# Patient Record
Sex: MALE | Race: WHITE | Employment: FULL TIME | URBAN - METROPOLITAN AREA
[De-identification: names, ages, dates, MRNs, and addresses within clinical notes are randomized per-mention and may not be internally consistent; named-entity substitution may affect disease eponyms.]

---

## 2019-05-25 ENCOUNTER — HOSPITAL ENCOUNTER (OUTPATIENT)
Age: 34
Discharge: HOME | End: 2019-05-25
Attending: FAMILY MEDICINE
Payer: COMMERCIAL

## 2019-05-25 VITALS
OXYGEN SATURATION: 96 % | WEIGHT: 250 LBS | SYSTOLIC BLOOD PRESSURE: 127 MMHG | HEIGHT: 70 IN | BODY MASS INDEX: 35.79 KG/M2 | HEART RATE: 72 BPM | DIASTOLIC BLOOD PRESSURE: 83 MMHG | TEMPERATURE: 98 F | RESPIRATION RATE: 18 BRPM
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
## 2019-05-25 PROCEDURE — 99203 OFFICE O/P NEW LOW 30 MIN: CPT

## 2019-05-25 RX ORDER — DOXYCYCLINE HYCLATE 100 MG/1
200 CAPSULE ORAL ONCE
Qty: 2 CAPSULE | Refills: 0 | Status: SHIPPED | OUTPATIENT
Start: 2019-05-25 | End: 2019-05-25

## 2019-05-25 NOTE — ED NOTES
Pt states he was in [place] the last 24 hours and thinks he has a tick in his left thigh. Not sure how long it has been there. Noticed it 2 hours ago.

## 2019-05-25 NOTE — ED PROVIDER NOTES
Patient Seen in: [facility] In [address] [place] [address]    History   Patient presents with:  Bite Sting,Insect (integumentary)    Stated Complaint: tick    HPI      Patient with a tick bite to the right medial lower thigh area.    This was in [facility] ([facility] has a normal mood and affect. His behavior is normal.   Nursing note and vitals reviewed. ED Course     After verbal consent, rest of the body of the possible take that was embedded into the skin was easily removed.     MDM     Disposition and Plan

## 2021-09-08 ENCOUNTER — TELEMEDICINE (OUTPATIENT)
Dept: FAMILY MEDICINE CLINIC | Facility: CLINIC | Age: 36
End: 2021-09-08

## 2021-09-08 DIAGNOSIS — Z86.79 HISTORY OF ESSENTIAL HYPERTENSION: ICD-10-CM

## 2021-09-08 DIAGNOSIS — Z86.39 HISTORY OF ELEVATED GLUCOSE: ICD-10-CM

## 2021-09-08 DIAGNOSIS — J01.90 ACUTE NON-RECURRENT SINUSITIS, UNSPECIFIED LOCATION: Primary | ICD-10-CM

## 2021-09-08 PROCEDURE — 99204 OFFICE O/P NEW MOD 45 MIN: CPT | Performed by: FAMILY MEDICINE

## 2021-09-08 RX ORDER — AMOXICILLIN 875 MG/1
875 TABLET, COATED ORAL 2 TIMES DAILY
Qty: 20 TABLET | Refills: 0 | Status: SHIPPED | OUTPATIENT
Start: 2021-09-08 | End: 2021-09-18

## 2021-09-08 RX ORDER — FLUTICASONE PROPIONATE 50 MCG
1 SPRAY, SUSPENSION (ML) NASAL DAILY
Qty: 16 G | Refills: 0 | Status: SHIPPED | OUTPATIENT
Start: 2021-09-08 | End: 2021-09-18

## 2021-09-08 NOTE — PROGRESS NOTES
This visit is conducted using Telemedicine with live, interactive video and audio. Patient has been referred to the [facility] website at [email or web address] to review the yearly Consent to Treat document.     Patient understands and accepts financial res for cough. Cardiovascular: Negative. EXAM:   There were no vitals taken for this visit. Physical Exam  Constitutional:       General: He is not in acute distress.   Pulmonary:      Effort: Pulmonary effort is normal.   Neurological:      Men

## 2021-09-20 ENCOUNTER — TELEMEDICINE (OUTPATIENT)
Dept: FAMILY MEDICINE CLINIC | Facility: CLINIC | Age: 36
End: 2021-09-20
Payer: COMMERCIAL

## 2021-09-20 ENCOUNTER — HOSPITAL ENCOUNTER (OUTPATIENT)
Dept: GENERAL RADIOLOGY | Age: 36
Discharge: HOME | End: 2021-09-20
Attending: FAMILY MEDICINE
Payer: COMMERCIAL

## 2021-09-20 DIAGNOSIS — R05.9 COUGH: Primary | ICD-10-CM

## 2021-09-20 DIAGNOSIS — R05.9 COUGH: ICD-10-CM

## 2021-09-20 PROCEDURE — 71046 X-RAY EXAM CHEST 2 VIEWS: CPT | Performed by: FAMILY MEDICINE

## 2021-09-20 PROCEDURE — 99213 OFFICE O/P EST LOW 20 MIN: CPT | Performed by: FAMILY MEDICINE

## 2021-09-20 NOTE — PROGRESS NOTES
Gloria Long is a 43year old male.   HPI:   Patient is following up the last appointment, he reports that overall he is doing much better, sinus congestion as well as postnasal drip has resolved completely but he continues to have cough, cough is more evide MG/5ML Oral Liquid; Take 5 mL by mouth in the morning, at noon, in the evening, and at bedtime for 14 days. Dispense: 118 mL; Refill: 0       The patient indicates understanding of these issues and agrees to the plan.       The above note was creating usin

## 2021-09-21 ENCOUNTER — TELEPHONE (OUTPATIENT)
Dept: FAMILY MEDICINE CLINIC | Facility: CLINIC | Age: 36
End: 2021-09-21

## 2021-09-21 NOTE — TELEPHONE ENCOUNTER
Patient seen in virtual today and was prescribed Dextromethorphan-guaiFENesin . He went to the pharmacy and they didn't fill it because it's sold over the counter.   I called pharmacy and they advised it's the same as Mucinex DM    Patient is asking if you

## 2021-09-22 RX ORDER — GUAIFENESIN AND CODEINE PHOSPHATE 100; 10 MG/5ML; MG/5ML
SOLUTION ORAL
Qty: 118 ML | Refills: 0 | Status: SHIPPED | OUTPATIENT
Start: 2021-09-22 | End: 2021-10-04

## 2021-09-22 NOTE — TELEPHONE ENCOUNTER
Patient calling to follow up on a stronger medication than just the over the counter. Please advise.

## 2021-09-22 NOTE — TELEPHONE ENCOUNTER
New prescription sent, please reinforce medication can cause drowsiness, in such case he should not be driving.

## 2021-10-01 ENCOUNTER — TELEPHONE (OUTPATIENT)
Dept: FAMILY MEDICINE CLINIC | Facility: CLINIC | Age: 36
End: 2021-10-01

## 2021-10-01 NOTE — TELEPHONE ENCOUNTER
Dr. Ran Cm- is patient okay to see you in person 10/4? Scheduled video visit but patient adamant on coming in, states you discussed him coming in if cough persists.     Please reply to ema: EM IDALMIS Keenan      Future Appointments   Date Time Provider Departme

## 2021-10-01 NOTE — TELEPHONE ENCOUNTER
Spoke with patient ( verified) and relayed dr. Latonia Dumas message below--patient verbalizes understanding and agreement. Video visit converted to in office visit. No further questions/concerns at this time.             MyChart Status: Active  Results Linda

## 2021-10-04 ENCOUNTER — OFFICE VISIT (OUTPATIENT)
Dept: FAMILY MEDICINE CLINIC | Facility: CLINIC | Age: 36
End: 2021-10-04

## 2021-10-04 ENCOUNTER — HOSPITAL ENCOUNTER (OUTPATIENT)
Dept: GENERAL RADIOLOGY | Age: 36
Discharge: HOME | End: 2021-10-04
Attending: FAMILY MEDICINE

## 2021-10-04 VITALS
HEIGHT: 70 IN | WEIGHT: 265 LBS | BODY MASS INDEX: 37.94 KG/M2 | SYSTOLIC BLOOD PRESSURE: 137 MMHG | HEART RATE: 81 BPM | DIASTOLIC BLOOD PRESSURE: 81 MMHG

## 2021-10-04 DIAGNOSIS — J34.3 HYPERTROPHY, NASAL, TURBINATE: ICD-10-CM

## 2021-10-04 DIAGNOSIS — R05.3 CHRONIC COUGH: Primary | ICD-10-CM

## 2021-10-04 DIAGNOSIS — R09.82 POSTNASAL DRIP: ICD-10-CM

## 2021-10-04 PROCEDURE — 3008F BODY MASS INDEX DOCD: CPT | Performed by: FAMILY MEDICINE

## 2021-10-04 PROCEDURE — 3079F DIAST BP 80-89 MM HG: CPT | Performed by: FAMILY MEDICINE

## 2021-10-04 PROCEDURE — 70220 X-RAY EXAM OF SINUSES: CPT | Performed by: FAMILY MEDICINE

## 2021-10-04 PROCEDURE — 3075F SYST BP GE 130 - 139MM HG: CPT | Performed by: FAMILY MEDICINE

## 2021-10-04 PROCEDURE — 99213 OFFICE O/P EST LOW 20 MIN: CPT | Performed by: FAMILY MEDICINE

## 2021-10-04 RX ORDER — CETIRIZINE HYDROCHLORIDE 10 MG/1
10 TABLET ORAL DAILY
Qty: 30 TABLET | Refills: 0 | Status: SHIPPED | OUTPATIENT
Start: 2021-10-04 | End: 2021-10-27

## 2021-10-04 NOTE — PROGRESS NOTES
Flynn Diehl is a 43year old male.   HPI:   Patient is here to follow-up in last visit, he report that he continues to have persistent cough, cough is especially present when he is talking, he explained that over the weekend symptoms were so severe that he Pulmonary effort is normal.      Breath sounds: Normal breath sounds. Musculoskeletal:      Cervical back: Normal range of motion. Neurological:      General: No focal deficit present.       Mental Status: He is alert and oriented to person, place, and

## 2021-10-07 ENCOUNTER — OFFICE VISIT (OUTPATIENT)
Dept: OTOLARYNGOLOGY | Facility: CLINIC | Age: 36
End: 2021-10-07
Payer: COMMERCIAL

## 2021-10-07 VITALS — WEIGHT: 265 LBS | BODY MASS INDEX: 37.94 KG/M2 | HEIGHT: 70 IN

## 2021-10-07 DIAGNOSIS — R05.9 COUGH IN ADULT PATIENT: Primary | ICD-10-CM

## 2021-10-07 DIAGNOSIS — J34.2 NASAL SEPTAL DEVIATION: ICD-10-CM

## 2021-10-07 PROCEDURE — 99203 OFFICE O/P NEW LOW 30 MIN: CPT | Performed by: SPECIALIST

## 2021-10-07 PROCEDURE — 92511 NASOPHARYNGOSCOPY: CPT | Performed by: SPECIALIST

## 2021-10-07 PROCEDURE — 3008F BODY MASS INDEX DOCD: CPT | Performed by: SPECIALIST

## 2021-10-07 RX ORDER — AZITHROMYCIN 250 MG/1
TABLET, FILM COATED ORAL
Qty: 6 TABLET | Refills: 1 | Status: SHIPPED | OUTPATIENT
Start: 2021-10-07

## 2021-10-07 NOTE — PATIENT INSTRUCTIONS
You had a left septal deviation. There was no sinusitis. I suspect you might have whooping cough. I placed you on a Z-Ramakrishna and would like you to repeated x1. Call if you are not significantly better by Monday.

## 2021-10-07 NOTE — PROGRESS NOTES
Eugene Kline is a 43year old male. Patient presents with:  Cough: pt has been having a cough for about 9 weeks now. pt has done multiple rounds of anitboitics and nasal sprays     HPI:   Very significant hacking cough.   His wife has had a similar cough whi Submental. Submandibular. Anterior cervical. Posterior cervical. Supraclavicular. All without enlargement   Psychiatric Orientation - Oriented to time, place, person & situation. Appropriate mood and affect.    Neurological Memory - Normal. Cranial nerves

## 2021-10-14 ENCOUNTER — LAB ENCOUNTER (OUTPATIENT)
Dept: LAB | Age: 36
End: 2021-10-14
Attending: SPECIALIST
Payer: COMMERCIAL

## 2021-10-14 ENCOUNTER — OFFICE VISIT (OUTPATIENT)
Dept: OTOLARYNGOLOGY | Facility: CLINIC | Age: 36
End: 2021-10-14
Payer: COMMERCIAL

## 2021-10-14 VITALS — BODY MASS INDEX: 37.94 KG/M2 | WEIGHT: 265 LBS | HEIGHT: 70 IN

## 2021-10-14 DIAGNOSIS — R05.9 COUGH: Primary | ICD-10-CM

## 2021-10-14 DIAGNOSIS — R05.9 COUGH: ICD-10-CM

## 2021-10-14 PROCEDURE — 86615 BORDETELLA ANTIBODY: CPT

## 2021-10-14 PROCEDURE — 99213 OFFICE O/P EST LOW 20 MIN: CPT | Performed by: SPECIALIST

## 2021-10-14 PROCEDURE — 36415 COLL VENOUS BLD VENIPUNCTURE: CPT

## 2021-10-14 PROCEDURE — 3008F BODY MASS INDEX DOCD: CPT | Performed by: SPECIALIST

## 2021-10-14 RX ORDER — PREDNISONE 20 MG/1
TABLET ORAL
Qty: 3 TABLET | Refills: 0 | Status: SHIPPED | OUTPATIENT
Start: 2021-10-14

## 2021-10-14 RX ORDER — AZITHROMYCIN 250 MG/1
TABLET, FILM COATED ORAL
Qty: 6 TABLET | Refills: 0 | Status: SHIPPED | OUTPATIENT
Start: 2021-10-14

## 2021-10-14 NOTE — PATIENT INSTRUCTIONS
We will get blood work to look for antibodies for whooping cough. In the meantime, continue Zithromax and I added a short course of steroid. I will call you with all results.

## 2021-10-14 NOTE — PROGRESS NOTES
Gloria Long is a 43year old male. Patient presents with:  Cough: Pt is here follow up  feels as if cough hasnt changed its been about 10 weeks now and cough is still there. HPI:   Coughing, some days are better and some still with a severe cough.     C Normal. Palpation - Normal. Parotid gland - Normal. Thyroid gland - Normal.   Lymph Detail Submental. Submandibular.  Anterior cervical. Posterior cervical. Supraclavicular all without enlargement   Psychiatric Orientation - Oriented to time, place, person

## 2021-10-18 RX ORDER — AZITHROMYCIN 250 MG/1
TABLET, FILM COATED ORAL
Qty: 6 TABLET | Refills: 0 | Status: SHIPPED | OUTPATIENT
Start: 2021-10-18

## 2021-10-27 ENCOUNTER — TELEPHONE (OUTPATIENT)
Dept: OTOLARYNGOLOGY | Facility: CLINIC | Age: 36
End: 2021-10-27

## 2021-10-27 DIAGNOSIS — R09.82 POSTNASAL DRIP: ICD-10-CM

## 2021-10-27 RX ORDER — CETIRIZINE HYDROCHLORIDE 10 MG/1
10 TABLET ORAL DAILY
Qty: 90 TABLET | Refills: 1 | Status: SHIPPED | OUTPATIENT
Start: 2021-10-27

## 2021-10-27 RX ORDER — AZITHROMYCIN 250 MG/1
TABLET, FILM COATED ORAL
Qty: 21 TABLET | Refills: 0 | OUTPATIENT
Start: 2021-10-27

## 2021-10-27 NOTE — TELEPHONE ENCOUNTER
Dr. Cynthia Li - Although Refill passed per [facility] protocol, you had sent a 30-day supply. Last office visit 10/4/21  Just wanted to check if this was a trial and if okay to fill 90 day supply with 1 refill? Please advise, thank you!   Medication pen

## 2021-10-28 RX ORDER — AZITHROMYCIN 250 MG/1
TABLET, FILM COATED ORAL
Qty: 6 TABLET | Refills: 0 | OUTPATIENT
Start: 2021-10-28

## 2023-02-22 ENCOUNTER — HOSPITAL ENCOUNTER (OUTPATIENT)
Age: 38
Discharge: HOME | End: 2023-02-22
Payer: COMMERCIAL

## 2023-02-22 VITALS
BODY MASS INDEX: 37.94 KG/M2 | DIASTOLIC BLOOD PRESSURE: 103 MMHG | SYSTOLIC BLOOD PRESSURE: 147 MMHG | WEIGHT: 265 LBS | RESPIRATION RATE: 18 BRPM | HEART RATE: 101 BPM | OXYGEN SATURATION: 97 % | HEIGHT: 70 IN | TEMPERATURE: 99 F

## 2023-02-22 DIAGNOSIS — R53.81 MALAISE AND FATIGUE: ICD-10-CM

## 2023-02-22 DIAGNOSIS — R53.83 MALAISE AND FATIGUE: ICD-10-CM

## 2023-02-22 DIAGNOSIS — R03.0 ELEVATED BP WITHOUT DIAGNOSIS OF HYPERTENSION: ICD-10-CM

## 2023-02-22 DIAGNOSIS — J02.0 STREP PHARYNGITIS: Primary | ICD-10-CM

## 2023-02-22 LAB
POCT INFLUENZA A: NEGATIVE
POCT INFLUENZA B: NEGATIVE
S PYO AG THROAT QL: POSITIVE

## 2023-02-22 PROCEDURE — 87880 STREP A ASSAY W/OPTIC: CPT | Performed by: NURSE PRACTITIONER

## 2023-02-22 PROCEDURE — 99203 OFFICE O/P NEW LOW 30 MIN: CPT | Performed by: NURSE PRACTITIONER

## 2023-02-22 PROCEDURE — 87502 INFLUENZA DNA AMP PROBE: CPT | Performed by: NURSE PRACTITIONER

## 2023-02-22 RX ORDER — AMOXICILLIN 500 MG/1
500 TABLET, FILM COATED ORAL 2 TIMES DAILY
Qty: 20 TABLET | Refills: 0 | Status: SHIPPED | OUTPATIENT
Start: 2023-02-22 | End: 2023-03-04

## 2023-02-22 NOTE — DISCHARGE INSTRUCTIONS
Amoxicillin 1 tablet twice per day for 10 days  Please take acetaminophen (Tylenol) 650-1000 mg every 6 hours for fever/pain  OR  Ibuprofen (Motrin, Advil) 600 mg every 6 hours for fever/pain, with food  Warm fluids  Throat drops/lozenges e.g. Halls, Cepacol  Warm salt water gargles (1/2 teaspoon of salt to 8 oz of warm water)  No sharing cups, utensils, straws, etc.  Throw away toothbrush in 24 hours  You are considered contagious until on antibiotics for at least 24 hours   For inability to swallow, voice changes, difficulty breathing, drooling or worsening symptoms, please go to the emergency room  See your primary care provider if no better after antibiotics      Your blood pressure is high today. Please check your blood pressure at home 3 times per day, and write it down. Do this for 5 days. Follow up with your doctor in 5 days, and bring the blood pressure log with you for review. For now, avoid high sodium foods. Avoid decongestants like Afrin nasal spray or Sudafed. Blood pressure over 180/100 is concerning, if you have 2 consecutive readings like this, please call your primary care provider or go directly to the emergency room. If you develop chest pain, shortness of breath, dizziness, headache or other unusual symptoms, please go to the emergency room.

## 2023-02-22 NOTE — ED INITIAL ASSESSMENT (HPI)
Sore throat and fever with body aches and cough since Saturday. +bilateral ear pain. 2 negative covid test, 1 on Saturday and 1 today.

## 2023-05-09 ENCOUNTER — HOSPITAL ENCOUNTER (OUTPATIENT)
Age: 38
Discharge: HOME | End: 2023-05-09
Payer: COMMERCIAL

## 2023-05-09 VITALS
OXYGEN SATURATION: 97 % | SYSTOLIC BLOOD PRESSURE: 146 MMHG | DIASTOLIC BLOOD PRESSURE: 89 MMHG | TEMPERATURE: 97 F | RESPIRATION RATE: 16 BRPM | HEART RATE: 78 BPM

## 2023-05-09 DIAGNOSIS — H66.91 RIGHT OTITIS MEDIA, UNSPECIFIED OTITIS MEDIA TYPE: Primary | ICD-10-CM

## 2023-05-09 PROCEDURE — 99213 OFFICE O/P EST LOW 20 MIN: CPT | Performed by: NURSE PRACTITIONER

## 2023-05-09 RX ORDER — AMOXICILLIN AND CLAVULANATE POTASSIUM 875; 125 MG/1; MG/1
1 TABLET, FILM COATED ORAL 2 TIMES DAILY
Qty: 20 TABLET | Refills: 0 | Status: SHIPPED | OUTPATIENT
Start: 2023-05-09 | End: 2023-05-19

## 2023-05-09 NOTE — DISCHARGE INSTRUCTIONS
Augmentin 1 tablet twice per day for 10 days with food  Continue tylelnol/ibuprofen for pain  Return for new or worsening symptoms

## 2023-10-04 ENCOUNTER — OFFICE VISIT (OUTPATIENT)
Dept: FAMILY MEDICINE CLINIC | Facility: CLINIC | Age: 38
End: 2023-10-04

## 2023-10-04 ENCOUNTER — EKG ENCOUNTER (OUTPATIENT)
Dept: LAB | Age: 38
End: 2023-10-04
Attending: FAMILY MEDICINE
Payer: COMMERCIAL

## 2023-10-04 ENCOUNTER — HOSPITAL ENCOUNTER (OUTPATIENT)
Dept: GENERAL RADIOLOGY | Age: 38
Discharge: HOME | End: 2023-10-04
Attending: FAMILY MEDICINE
Payer: COMMERCIAL

## 2023-10-04 ENCOUNTER — LAB ENCOUNTER (OUTPATIENT)
Dept: LAB | Age: 38
End: 2023-10-04
Attending: FAMILY MEDICINE
Payer: COMMERCIAL

## 2023-10-04 VITALS
WEIGHT: 270 LBS | SYSTOLIC BLOOD PRESSURE: 145 MMHG | BODY MASS INDEX: 38.65 KG/M2 | HEART RATE: 77 BPM | RESPIRATION RATE: 17 BRPM | DIASTOLIC BLOOD PRESSURE: 80 MMHG | TEMPERATURE: 98 F | HEIGHT: 70 IN

## 2023-10-04 DIAGNOSIS — I10 HYPERTENSION, ESSENTIAL: ICD-10-CM

## 2023-10-04 DIAGNOSIS — R05.1 ACUTE COUGH: ICD-10-CM

## 2023-10-04 DIAGNOSIS — R05.1 ACUTE COUGH: Primary | ICD-10-CM

## 2023-10-04 LAB
ALBUMIN SERPL-MCNC: 4.2 G/DL (ref 3.4–5)
ALBUMIN/GLOB SERPL: 1 {RATIO} (ref 1–2)
ALP LIVER SERPL-CCNC: 66 U/L
ALT SERPL-CCNC: 62 U/L
ANION GAP SERPL CALC-SCNC: 8 MMOL/L (ref 0–18)
AST SERPL-CCNC: 27 U/L (ref 15–37)
BASOPHILS # BLD AUTO: 0.09 X10(3) UL (ref 0–0.2)
BASOPHILS NFR BLD AUTO: 1 %
BILIRUB SERPL-MCNC: 0.6 MG/DL (ref 0.1–2)
BUN BLD-MCNC: 18 MG/DL (ref 7–18)
BUN/CREAT SERPL: 14.4 (ref 10–20)
CALCIUM BLD-MCNC: 9.5 MG/DL (ref 8.5–10.1)
CHLORIDE SERPL-SCNC: 106 MMOL/L (ref 98–112)
CHOLEST SERPL-MCNC: 252 MG/DL (ref ?–200)
CO2 SERPL-SCNC: 28 MMOL/L (ref 21–32)
CREAT BLD-MCNC: 1.25 MG/DL
DEPRECATED RDW RBC AUTO: 40 FL (ref 35.1–46.3)
EGFRCR SERPLBLD CKD-EPI 2021: 75 ML/MIN/1.73M2 (ref 60–?)
EOSINOPHIL # BLD AUTO: 0.16 X10(3) UL (ref 0–0.7)
EOSINOPHIL NFR BLD AUTO: 1.8 %
ERYTHROCYTE [DISTWIDTH] IN BLOOD BY AUTOMATED COUNT: 12.7 % (ref 11–15)
FASTING PATIENT LIPID ANSWER: YES
FASTING STATUS PATIENT QL REPORTED: YES
GLOBULIN PLAS-MCNC: 4.1 G/DL (ref 2.8–4.4)
GLUCOSE BLD-MCNC: 98 MG/DL (ref 70–99)
HCT VFR BLD AUTO: 51.8 %
HDLC SERPL-MCNC: 54 MG/DL (ref 40–59)
HGB BLD-MCNC: 17.5 G/DL
IMM GRANULOCYTES # BLD AUTO: 0.04 X10(3) UL (ref 0–1)
IMM GRANULOCYTES NFR BLD: 0.4 %
LDLC SERPL CALC-MCNC: 165 MG/DL (ref ?–100)
LYMPHOCYTES # BLD AUTO: 2.53 X10(3) UL (ref 1–4)
LYMPHOCYTES NFR BLD AUTO: 28.2 %
MCH RBC QN AUTO: 29.5 PG (ref 26–34)
MCHC RBC AUTO-ENTMCNC: 33.8 G/DL (ref 31–37)
MCV RBC AUTO: 87.2 FL
MONOCYTES # BLD AUTO: 0.67 X10(3) UL (ref 0.1–1)
MONOCYTES NFR BLD AUTO: 7.5 %
NEUTROPHILS # BLD AUTO: 5.47 X10 (3) UL (ref 1.5–7.7)
NEUTROPHILS # BLD AUTO: 5.47 X10(3) UL (ref 1.5–7.7)
NEUTROPHILS NFR BLD AUTO: 61.1 %
NONHDLC SERPL-MCNC: 198 MG/DL (ref ?–130)
OSMOLALITY SERPL CALC.SUM OF ELEC: 296 MOSM/KG (ref 275–295)
PLATELET # BLD AUTO: 283 10(3)UL (ref 150–450)
POTASSIUM SERPL-SCNC: 4.3 MMOL/L (ref 3.5–5.1)
PROT SERPL-MCNC: 8.3 G/DL (ref 6.4–8.2)
RBC # BLD AUTO: 5.94 X10(6)UL
SODIUM SERPL-SCNC: 142 MMOL/L (ref 136–145)
TRIGL SERPL-MCNC: 180 MG/DL (ref 30–149)
VLDLC SERPL CALC-MCNC: 36 MG/DL (ref 0–30)
WBC # BLD AUTO: 9 X10(3) UL (ref 4–11)

## 2023-10-04 PROCEDURE — 93010 ELECTROCARDIOGRAM REPORT: CPT | Performed by: INTERNAL MEDICINE

## 2023-10-04 PROCEDURE — 85025 COMPLETE CBC W/AUTO DIFF WBC: CPT

## 2023-10-04 PROCEDURE — 71046 X-RAY EXAM CHEST 2 VIEWS: CPT | Performed by: FAMILY MEDICINE

## 2023-10-04 PROCEDURE — 80053 COMPREHEN METABOLIC PANEL: CPT

## 2023-10-04 PROCEDURE — 80061 LIPID PANEL: CPT

## 2023-10-04 PROCEDURE — 93005 ELECTROCARDIOGRAM TRACING: CPT

## 2023-10-04 PROCEDURE — 36415 COLL VENOUS BLD VENIPUNCTURE: CPT

## 2023-10-04 RX ORDER — BENZONATATE 200 MG/1
200 CAPSULE ORAL 3 TIMES DAILY PRN
Qty: 30 CAPSULE | Refills: 0 | Status: SHIPPED | OUTPATIENT
Start: 2023-10-04

## 2023-10-04 RX ORDER — LOSARTAN POTASSIUM 50 MG/1
50 TABLET ORAL DAILY
Qty: 90 TABLET | Refills: 0 | Status: SHIPPED | OUTPATIENT
Start: 2023-10-04

## 2023-10-05 LAB
ATRIAL RATE: 67 BPM
P AXIS: 40 DEGREES
P-R INTERVAL: 152 MS
Q-T INTERVAL: 386 MS
QRS DURATION: 98 MS
QTC CALCULATION (BEZET): 407 MS
R AXIS: 51 DEGREES
T AXIS: 22 DEGREES
VENTRICULAR RATE: 67 BPM

## 2023-10-18 ENCOUNTER — OFFICE VISIT (OUTPATIENT)
Dept: FAMILY MEDICINE CLINIC | Facility: CLINIC | Age: 38
End: 2023-10-18
Payer: COMMERCIAL

## 2023-10-18 VITALS
SYSTOLIC BLOOD PRESSURE: 118 MMHG | RESPIRATION RATE: 18 BRPM | HEIGHT: 70 IN | HEART RATE: 83 BPM | BODY MASS INDEX: 38.22 KG/M2 | DIASTOLIC BLOOD PRESSURE: 78 MMHG | WEIGHT: 267 LBS

## 2023-10-18 DIAGNOSIS — Z13.0 SCREENING FOR DEFICIENCY ANEMIA: ICD-10-CM

## 2023-10-18 DIAGNOSIS — Z13.220 LIPID SCREENING: ICD-10-CM

## 2023-10-18 DIAGNOSIS — R73.09 ELEVATED GLUCOSE: ICD-10-CM

## 2023-10-18 DIAGNOSIS — Z23 NEED FOR TDAP VACCINATION: ICD-10-CM

## 2023-10-18 DIAGNOSIS — I10 HYPERTENSION, ESSENTIAL: ICD-10-CM

## 2023-10-18 DIAGNOSIS — Z00.00 WELLNESS EXAMINATION: Primary | ICD-10-CM

## 2023-10-18 DIAGNOSIS — Z13.29 THYROID DISORDER SCREEN: ICD-10-CM

## 2023-10-18 DIAGNOSIS — R05.1 ACUTE COUGH: ICD-10-CM

## 2023-10-18 PROCEDURE — 90471 IMMUNIZATION ADMIN: CPT | Performed by: FAMILY MEDICINE

## 2023-10-18 PROCEDURE — 99213 OFFICE O/P EST LOW 20 MIN: CPT | Performed by: FAMILY MEDICINE

## 2023-10-18 PROCEDURE — 3078F DIAST BP <80 MM HG: CPT | Performed by: FAMILY MEDICINE

## 2023-10-18 PROCEDURE — 3074F SYST BP LT 130 MM HG: CPT | Performed by: FAMILY MEDICINE

## 2023-10-18 PROCEDURE — 3008F BODY MASS INDEX DOCD: CPT | Performed by: FAMILY MEDICINE

## 2023-10-18 PROCEDURE — 99396 PREV VISIT EST AGE 40-64: CPT | Performed by: FAMILY MEDICINE

## 2023-10-18 PROCEDURE — 90715 TDAP VACCINE 7 YRS/> IM: CPT | Performed by: FAMILY MEDICINE

## 2023-10-18 RX ORDER — ALBUTEROL SULFATE 90 UG/1
2 AEROSOL, METERED RESPIRATORY (INHALATION) EVERY 4 HOURS PRN
Qty: 18 G | Refills: 5 | Status: SHIPPED | OUTPATIENT
Start: 2023-10-18

## 2023-10-18 RX ORDER — LOSARTAN POTASSIUM 50 MG/1
50 TABLET ORAL DAILY
Qty: 90 TABLET | Refills: 3 | Status: SHIPPED | OUTPATIENT
Start: 2023-10-18

## 2023-10-18 RX ORDER — AZITHROMYCIN 250 MG/1
TABLET, FILM COATED ORAL
Qty: 6 TABLET | Refills: 0 | Status: SHIPPED | OUTPATIENT
Start: 2023-10-18 | End: 2023-10-22

## 2023-10-18 RX ORDER — PREDNISONE 20 MG/1
TABLET ORAL
Qty: 10 TABLET | Refills: 0 | Status: SHIPPED | OUTPATIENT
Start: 2023-10-18

## 2023-11-02 ENCOUNTER — HOSPITAL ENCOUNTER (OUTPATIENT)
Age: 38
Discharge: HOME | End: 2023-11-02
Payer: COMMERCIAL

## 2023-11-02 VITALS
TEMPERATURE: 98 F | OXYGEN SATURATION: 97 % | HEART RATE: 83 BPM | DIASTOLIC BLOOD PRESSURE: 83 MMHG | SYSTOLIC BLOOD PRESSURE: 147 MMHG | RESPIRATION RATE: 18 BRPM

## 2023-11-02 DIAGNOSIS — H65.01 NON-RECURRENT ACUTE SEROUS OTITIS MEDIA OF RIGHT EAR: ICD-10-CM

## 2023-11-02 DIAGNOSIS — H65.192 OTHER NON-RECURRENT ACUTE NONSUPPURATIVE OTITIS MEDIA OF LEFT EAR: Primary | ICD-10-CM

## 2023-11-02 PROCEDURE — 99213 OFFICE O/P EST LOW 20 MIN: CPT | Performed by: NURSE PRACTITIONER

## 2023-11-02 RX ORDER — AMOXICILLIN AND CLAVULANATE POTASSIUM 875; 125 MG/1; MG/1
1 TABLET, FILM COATED ORAL 2 TIMES DAILY
Qty: 20 TABLET | Refills: 0 | Status: SHIPPED | OUTPATIENT
Start: 2023-11-02 | End: 2023-11-12

## 2023-11-02 NOTE — DISCHARGE INSTRUCTIONS
Augmentin 1 tablet twice per day for 10 days with food  Flonase nasal spray 2 sprays in each nostril daily  Sudafed twice daily for 3 days  Return for new/worsening symptoms  Follow up with ENT as discussed

## 2023-11-04 ENCOUNTER — OFFICE VISIT (OUTPATIENT)
Dept: OTOLARYNGOLOGY | Facility: CLINIC | Age: 38
End: 2023-11-04
Payer: COMMERCIAL

## 2023-11-04 VITALS — WEIGHT: 267 LBS | BODY MASS INDEX: 38.22 KG/M2 | HEIGHT: 70 IN

## 2023-11-04 DIAGNOSIS — R05.1 ACUTE COUGH: ICD-10-CM

## 2023-11-04 DIAGNOSIS — H65.03 NON-RECURRENT ACUTE SEROUS OTITIS MEDIA OF BOTH EARS: Primary | ICD-10-CM

## 2023-11-04 PROCEDURE — 3008F BODY MASS INDEX DOCD: CPT | Performed by: SPECIALIST

## 2023-11-04 PROCEDURE — 99213 OFFICE O/P EST LOW 20 MIN: CPT | Performed by: SPECIALIST

## 2023-11-04 RX ORDER — PREDNISONE 20 MG/1
20 TABLET ORAL DAILY
Qty: 3 TABLET | Refills: 0 | Status: SHIPPED | OUTPATIENT
Start: 2023-11-04

## 2023-11-04 RX ORDER — AZITHROMYCIN 250 MG/1
TABLET, FILM COATED ORAL
Qty: 11 TABLET | Refills: 0 | Status: SHIPPED | OUTPATIENT
Start: 2023-11-04

## 2023-11-04 NOTE — PATIENT INSTRUCTIONS
You had a bilateral serous otitis media left greater than right. You were placed on 3 days of prednisone and azithromycin. Follow-up in 3 weeks time, sooner if problems.

## 2023-11-09 ENCOUNTER — LAB ENCOUNTER (OUTPATIENT)
Dept: LAB | Age: 38
End: 2023-11-09
Attending: FAMILY MEDICINE
Payer: COMMERCIAL

## 2023-11-09 DIAGNOSIS — Z13.0 SCREENING FOR DEFICIENCY ANEMIA: ICD-10-CM

## 2023-11-09 DIAGNOSIS — D75.1 ERYTHROCYTOSIS: Primary | ICD-10-CM

## 2023-11-09 DIAGNOSIS — Z13.29 THYROID DISORDER SCREEN: ICD-10-CM

## 2023-11-09 DIAGNOSIS — R73.09 ELEVATED GLUCOSE: ICD-10-CM

## 2023-11-09 DIAGNOSIS — Z00.00 WELLNESS EXAMINATION: ICD-10-CM

## 2023-11-09 LAB
BASOPHILS # BLD AUTO: 0.08 X10(3) UL (ref 0–0.2)
BASOPHILS NFR BLD AUTO: 1.1 %
DEPRECATED RDW RBC AUTO: 40.1 FL (ref 35.1–46.3)
EOSINOPHIL # BLD AUTO: 0.12 X10(3) UL (ref 0–0.7)
EOSINOPHIL NFR BLD AUTO: 1.7 %
ERYTHROCYTE [DISTWIDTH] IN BLOOD BY AUTOMATED COUNT: 12.6 % (ref 11–15)
EST. AVERAGE GLUCOSE BLD GHB EST-MCNC: 117 MG/DL (ref 68–126)
HBA1C MFR BLD: 5.7 % (ref ?–5.7)
HCT VFR BLD AUTO: 52.2 %
HGB BLD-MCNC: 17.8 G/DL
IMM GRANULOCYTES # BLD AUTO: 0.03 X10(3) UL (ref 0–1)
IMM GRANULOCYTES NFR BLD: 0.4 %
LYMPHOCYTES # BLD AUTO: 2.25 X10(3) UL (ref 1–4)
LYMPHOCYTES NFR BLD AUTO: 31.7 %
MCH RBC QN AUTO: 29.7 PG (ref 26–34)
MCHC RBC AUTO-ENTMCNC: 34.1 G/DL (ref 31–37)
MCV RBC AUTO: 87.1 FL
MONOCYTES # BLD AUTO: 0.55 X10(3) UL (ref 0.1–1)
MONOCYTES NFR BLD AUTO: 7.8 %
NEUTROPHILS # BLD AUTO: 4.06 X10 (3) UL (ref 1.5–7.7)
NEUTROPHILS # BLD AUTO: 4.06 X10(3) UL (ref 1.5–7.7)
NEUTROPHILS NFR BLD AUTO: 57.3 %
PLATELET # BLD AUTO: 271 10(3)UL (ref 150–450)
RBC # BLD AUTO: 5.99 X10(6)UL
TSI SER-ACNC: 1.05 MIU/ML (ref 0.55–4.78)
WBC # BLD AUTO: 7.1 X10(3) UL (ref 4–11)

## 2023-11-09 PROCEDURE — 36415 COLL VENOUS BLD VENIPUNCTURE: CPT

## 2023-11-09 PROCEDURE — 83036 HEMOGLOBIN GLYCOSYLATED A1C: CPT

## 2023-11-09 PROCEDURE — 84443 ASSAY THYROID STIM HORMONE: CPT

## 2023-11-09 PROCEDURE — 85025 COMPLETE CBC W/AUTO DIFF WBC: CPT

## 2023-11-13 ENCOUNTER — HOSPITAL ENCOUNTER (OUTPATIENT)
Dept: RESPIRATORY THERAPY | Facility: HOSPITAL | Age: 38
Discharge: HOME | End: 2023-11-13
Attending: FAMILY MEDICINE
Payer: COMMERCIAL

## 2023-11-13 DIAGNOSIS — R05.1 ACUTE COUGH: ICD-10-CM

## 2023-11-13 PROCEDURE — 94060 EVALUATION OF WHEEZING: CPT | Performed by: INTERNAL MEDICINE

## 2023-11-13 PROCEDURE — 94729 DIFFUSING CAPACITY: CPT | Performed by: INTERNAL MEDICINE

## 2023-11-13 PROCEDURE — 94726 PLETHYSMOGRAPHY LUNG VOLUMES: CPT | Performed by: INTERNAL MEDICINE

## 2023-11-14 NOTE — PROCEDURES
[facility]    PFT Interpretation    Aditya Copeland     5/3/1985 MRN [id number]   Height  79 inh  Age 34year old   Weight  267 lbs  Sex Male         Spirometry:   FEV1 4.07 L which is 96%  FEV1/FVC ratio 75%    No significant bronchodilator response      FVL:   Normal      Lung Volume:   Within normal limits  TLC 8.02 L which is 115%  RV/TLC ratio is normal      DLCO:   111%      Impression:   Normal study        Thank you for allowing me to participate in the care of your patient. Mitzi Olivier.  Luz Maria Jiménez MD  2023  4:18 PM

## 2023-11-15 ENCOUNTER — OFFICE VISIT (OUTPATIENT)
Dept: HEMATOLOGY/ONCOLOGY | Facility: HOSPITAL | Age: 38
End: 2023-11-15
Attending: FAMILY MEDICINE
Payer: COMMERCIAL

## 2023-11-15 VITALS
WEIGHT: 273 LBS | HEART RATE: 79 BPM | DIASTOLIC BLOOD PRESSURE: 92 MMHG | SYSTOLIC BLOOD PRESSURE: 148 MMHG | RESPIRATION RATE: 18 BRPM | BODY MASS INDEX: 39.08 KG/M2 | HEIGHT: 70 IN | TEMPERATURE: 98 F | OXYGEN SATURATION: 97 %

## 2023-11-15 DIAGNOSIS — D75.1 ERYTHROCYTOSIS: Primary | ICD-10-CM

## 2023-11-15 DIAGNOSIS — R05.9 COUGH, UNSPECIFIED TYPE: ICD-10-CM

## 2023-11-15 PROCEDURE — 99244 OFF/OP CNSLTJ NEW/EST MOD 40: CPT | Performed by: INTERNAL MEDICINE

## 2023-11-16 ENCOUNTER — TELEPHONE (OUTPATIENT)
Dept: PULMONOLOGY | Facility: CLINIC | Age: 38
End: 2023-11-16

## 2023-11-16 NOTE — TELEPHONE ENCOUNTER
argenis FOFANA apt with Jamin  Received: Sharan Suero RN  P Em Pulmo Clinical Staff  Cc: IDALMIS Bagley Dr requesting a apt for this patient with Dr Viri samano for 6 week cough, erythrocytosis and possible sleep apnea. Can you help reach out to patient and arrange.   Let me know  Thanks  Gibran Salcido

## 2023-11-16 NOTE — TELEPHONE ENCOUNTER
Pt accepted appt w/ Dr. Jessica Elena on 11/29 3 pm [facility]. Appt info given. He voiced understanding. Pt declined earlier appt offered.

## 2023-11-21 ENCOUNTER — OFFICE VISIT (OUTPATIENT)
Dept: OTOLARYNGOLOGY | Facility: CLINIC | Age: 38
End: 2023-11-21
Payer: COMMERCIAL

## 2023-11-21 VITALS — WEIGHT: 273 LBS | HEIGHT: 70 IN | BODY MASS INDEX: 39.08 KG/M2

## 2023-11-21 DIAGNOSIS — K21.9 GASTROESOPHAGEAL REFLUX DISEASE, UNSPECIFIED WHETHER ESOPHAGITIS PRESENT: ICD-10-CM

## 2023-11-21 DIAGNOSIS — R05.2 SUBACUTE COUGH: Primary | ICD-10-CM

## 2023-11-21 DIAGNOSIS — H65.03 NON-RECURRENT ACUTE SEROUS OTITIS MEDIA OF BOTH EARS: ICD-10-CM

## 2023-11-21 PROCEDURE — 3008F BODY MASS INDEX DOCD: CPT | Performed by: SPECIALIST

## 2023-11-21 PROCEDURE — 31575 DIAGNOSTIC LARYNGOSCOPY: CPT | Performed by: SPECIALIST

## 2023-11-21 PROCEDURE — 99213 OFFICE O/P EST LOW 20 MIN: CPT | Performed by: SPECIALIST

## 2023-11-21 RX ORDER — OMEPRAZOLE 40 MG/1
40 CAPSULE, DELAYED RELEASE ORAL DAILY
Qty: 30 CAPSULE | Refills: 2 | Status: SHIPPED | OUTPATIENT
Start: 2023-11-21

## 2023-11-21 RX ORDER — BENZONATATE 200 MG/1
200 CAPSULE ORAL 3 TIMES DAILY PRN
Qty: 30 CAPSULE | Refills: 1 | Status: SHIPPED | OUTPATIENT
Start: 2023-11-21

## 2023-11-21 NOTE — PATIENT INSTRUCTIONS
Complete resolution of your serous otitis media after the prednisone and azithromycin. Your cough however is persistent. I placed you on a trial of omeprazole and antireflux diet. I also gave you a prescription for Tessalon Perles. Follow-up in 4 weeks time, sooner if problems. ,  If not feeling better in 1 week.

## 2023-11-29 ENCOUNTER — OFFICE VISIT (OUTPATIENT)
Dept: PULMONOLOGY | Facility: CLINIC | Age: 38
End: 2023-11-29
Payer: COMMERCIAL

## 2023-11-29 VITALS
WEIGHT: 271.81 LBS | DIASTOLIC BLOOD PRESSURE: 79 MMHG | BODY MASS INDEX: 38.91 KG/M2 | OXYGEN SATURATION: 96 % | SYSTOLIC BLOOD PRESSURE: 122 MMHG | HEART RATE: 93 BPM | HEIGHT: 70 IN | RESPIRATION RATE: 16 BRPM

## 2023-11-29 DIAGNOSIS — R05.2 SUBACUTE COUGH: Primary | ICD-10-CM

## 2023-11-29 DIAGNOSIS — J45.31 MILD PERSISTENT REACTIVE AIRWAY DISEASE WITH ACUTE EXACERBATION: ICD-10-CM

## 2023-11-29 DIAGNOSIS — G47.33 OSA (OBSTRUCTIVE SLEEP APNEA): ICD-10-CM

## 2023-11-29 PROCEDURE — 3078F DIAST BP <80 MM HG: CPT | Performed by: INTERNAL MEDICINE

## 2023-11-29 PROCEDURE — 99244 OFF/OP CNSLTJ NEW/EST MOD 40: CPT | Performed by: INTERNAL MEDICINE

## 2023-11-29 PROCEDURE — 3074F SYST BP LT 130 MM HG: CPT | Performed by: INTERNAL MEDICINE

## 2023-11-29 PROCEDURE — 3008F BODY MASS INDEX DOCD: CPT | Performed by: INTERNAL MEDICINE

## 2023-11-29 RX ORDER — AZITHROMYCIN 250 MG/1
TABLET, FILM COATED ORAL
Qty: 6 TABLET | Refills: 0 | Status: SHIPPED | OUTPATIENT
Start: 2023-11-29

## 2023-11-29 RX ORDER — BUDESONIDE AND FORMOTEROL FUMARATE DIHYDRATE 160; 4.5 UG/1; UG/1
2 AEROSOL RESPIRATORY (INHALATION) 2 TIMES DAILY
Qty: 1 EACH | Refills: 1 | Status: SHIPPED | OUTPATIENT
Start: 2023-11-29 | End: 2024-11-28

## 2023-11-29 NOTE — H&P
[facility], [facility]    Pulmonary consult     Aditya Copeland Patient Status:  Specimen    5/3/1985 MRN [id number]   Location [facility], [place] [address] [address] Attending No att. providers found   Hosp Day # 0 PCP Heriberto Phoenix. Shilpi Elkins MD     Date:  2023    History provided by:patient  HPI:     Chief Complaint   Patient presents with    Referral    Cough     HPI    25-year-old gentleman with history of obesity with a BMI of 39, HTN, erythrocytosis  Patient referred to my clinic for evaluation of cough for 8 to 9 weeks which is dry in nature worse during cough and at night with no postnasal drip or acid reflux no significant chest pain or shortness of breath  Denies fever or chills or night sweats or weight loss  No lower extremity edema or pain or calf tenderness  No sputum production or hemoptysis  Denied neck pain or sore throat    Also patient would like to be evaluated for EKATERINA  Reported loud snoring and apnea during sleep  Nocturia  In bed 8:30 PM to 6 AM and not refreshed in the morning  No sleep attack or sleep paralysis  No restless leg symptoms  ESS 14    History   History reviewed. No pertinent past medical history. Past Surgical History:   Procedure Laterality Date    KNEE ARTHROSCOPY  2014    X3     Family History   Problem Relation Age of Onset    No Known Problems Mother     Stroke Father 46    Hypertension Father     Cancer Maternal Grandmother         Breast     Social History:  Social History     Socioeconomic History    Marital status:    Tobacco Use    Smoking status: Never    Smokeless tobacco: Never   Substance and Sexual Activity    Alcohol use: Yes     Alcohol/week: 0.0 standard drinks of alcohol     Comment: social    Drug use: Never     Allergies/Medications: Allergies: No Known Allergies  (Not in a hospital admission)      Review of Systems:     Constitutional:  Positive for fatigue.  Negative for fever and unexpected weight change. HENT:  Positive for congestion. Respiratory:  Positive for apnea and cough. Negative for choking, shortness of breath, wheezing and stridor. Cardiovascular: Negative. Gastrointestinal: Negative. Genitourinary:  Positive for nocturia. Musculoskeletal: Negative. Skin: Negative. Neurological: Negative. Hematological: Negative. Psychiatric/Behavioral: Negative. Physical Exam:   Vital Signs:  Blood pressure 122/79, pulse 93, resp. rate 16, height 5' 10\" (1.778 m), weight 271 lb 12.8 oz (123.3 kg), SpO2 96%. Physical Exam  Constitutional:       General: He is not in acute distress. Appearance: He is obese. He is not ill-appearing. HENT:      Head: Normocephalic and atraumatic. Nose: Congestion present. No rhinorrhea. Mouth/Throat:      Mouth: Mucous membranes are moist.      Comments: Upper airway class III Mallampati score  Eyes:      General: No scleral icterus. Conjunctiva/sclera: Conjunctivae normal.      Pupils: Pupils are equal, round, and reactive to light. Cardiovascular:      Rate and Rhythm: Normal rate. Heart sounds: No murmur heard. No gallop. Pulmonary:      Effort: Pulmonary effort is normal. No respiratory distress. Breath sounds: No stridor. No wheezing, rhonchi or rales. Abdominal:      General: Abdomen is flat. Bowel sounds are normal. There is no distension. Tenderness: There is no abdominal tenderness. There is no guarding or rebound. Musculoskeletal:         General: No swelling. Cervical back: Normal range of motion. No rigidity. Right lower leg: No edema. Left lower leg: No edema. Lymphadenopathy:      Cervical: No cervical adenopathy. Skin:     General: Skin is dry. Neurological:      General: No focal deficit present. Mental Status: He is oriented to person, place, and time.            Results:     Lab Results   Component Value Date    WBC 7.1 11/09/2023    HGB 17.8 (H) 11/09/2023    HCT 52.2 11/09/2023    .0 11/09/2023    CREATSERUM 1.25 10/04/2023    BUN 18 10/04/2023     10/04/2023    K 4.3 10/04/2023     10/04/2023    CO2 28.0 10/04/2023    GLU 98 10/04/2023    CA 9.5 10/04/2023    ALB 4.2 10/04/2023    ALKPHO 66 10/04/2023    BILT 0.6 10/04/2023    TP 8.3 (H) 10/04/2023    AST 27 10/04/2023    ALT 62 (H) 10/04/2023    TSH 1.052 11/09/2023     CXR clear     Assessment/Plan:      1-subacute dry cough which is likely reactive airway  No history of smoking or vaping  No birds or cats and no occupational exposure  Chest x-ray clear with normal PFTs  Relatively clear lungs on exam    Plan :  Trial of ICS/LAMA inhaler with Symbicort 160/4.5 mcg 2 puffs bid  Course of a Z-Ramakrishna  Flonase as needed  Reevaluate in 6 to 8 weeks    2-suspected EKATERINA  BMI 39 with upper airway class III Mallampati score  Erythrocytosis  ESS of 14 and symptomatic    Plan :  Home sleep study if positive will start auto CPAP    Diet and exercise and weight reduction  Avoid driving if sleepy  Avoid sedative and narcotic and alcohol    Orders for today;  Home sleep study  Symbicort inhaler  Z-Ramakrishna    Follow-up in 8 weeks              Alan Wood.  Yosvany Hanks MD  11/29/2023

## 2023-12-16 ENCOUNTER — LAB ENCOUNTER (OUTPATIENT)
Dept: LAB | Age: 38
End: 2023-12-16
Attending: INTERNAL MEDICINE
Payer: COMMERCIAL

## 2023-12-16 DIAGNOSIS — D75.1 ERYTHROCYTOSIS: ICD-10-CM

## 2023-12-16 LAB
BASOPHILS # BLD AUTO: 0.07 X10(3) UL (ref 0–0.2)
BASOPHILS NFR BLD AUTO: 1 %
DEPRECATED RDW RBC AUTO: 38.4 FL (ref 35.1–46.3)
EOSINOPHIL # BLD AUTO: 0.11 X10(3) UL (ref 0–0.7)
EOSINOPHIL NFR BLD AUTO: 1.6 %
ERYTHROCYTE [DISTWIDTH] IN BLOOD BY AUTOMATED COUNT: 12.1 % (ref 11–15)
HCT VFR BLD AUTO: 48.2 %
HGB BLD-MCNC: 16.9 G/DL
IMM GRANULOCYTES # BLD AUTO: 0.03 X10(3) UL (ref 0–1)
IMM GRANULOCYTES NFR BLD: 0.4 %
LYMPHOCYTES # BLD AUTO: 2.61 X10(3) UL (ref 1–4)
LYMPHOCYTES NFR BLD AUTO: 37.7 %
MCH RBC QN AUTO: 30.1 PG (ref 26–34)
MCHC RBC AUTO-ENTMCNC: 35.1 G/DL (ref 31–37)
MCV RBC AUTO: 85.9 FL
MONOCYTES # BLD AUTO: 0.47 X10(3) UL (ref 0.1–1)
MONOCYTES NFR BLD AUTO: 6.8 %
NEUTROPHILS # BLD AUTO: 3.63 X10 (3) UL (ref 1.5–7.7)
NEUTROPHILS # BLD AUTO: 3.63 X10(3) UL (ref 1.5–7.7)
NEUTROPHILS NFR BLD AUTO: 52.5 %
PLATELET # BLD AUTO: 255 10(3)UL (ref 150–450)
RBC # BLD AUTO: 5.61 X10(6)UL
WBC # BLD AUTO: 6.9 X10(3) UL (ref 4–11)

## 2023-12-16 PROCEDURE — 85025 COMPLETE CBC W/AUTO DIFF WBC: CPT

## 2023-12-16 PROCEDURE — 36415 COLL VENOUS BLD VENIPUNCTURE: CPT

## 2023-12-16 PROCEDURE — 82668 ASSAY OF ERYTHROPOIETIN: CPT

## 2023-12-19 ENCOUNTER — OFFICE VISIT (OUTPATIENT)
Dept: HEMATOLOGY/ONCOLOGY | Facility: HOSPITAL | Age: 38
End: 2023-12-19
Attending: INTERNAL MEDICINE
Payer: COMMERCIAL

## 2023-12-19 ENCOUNTER — OFFICE VISIT (OUTPATIENT)
Dept: OTOLARYNGOLOGY | Facility: CLINIC | Age: 38
End: 2023-12-19
Payer: COMMERCIAL

## 2023-12-19 VITALS
HEART RATE: 96 BPM | TEMPERATURE: 98 F | OXYGEN SATURATION: 96 % | BODY MASS INDEX: 39.22 KG/M2 | WEIGHT: 274 LBS | HEIGHT: 70 IN | SYSTOLIC BLOOD PRESSURE: 136 MMHG | RESPIRATION RATE: 18 BRPM | DIASTOLIC BLOOD PRESSURE: 79 MMHG

## 2023-12-19 VITALS — WEIGHT: 274 LBS | BODY MASS INDEX: 39.22 KG/M2 | HEIGHT: 70 IN

## 2023-12-19 DIAGNOSIS — D75.1 ERYTHROCYTOSIS: Primary | ICD-10-CM

## 2023-12-19 DIAGNOSIS — R05.2 SUBACUTE COUGH: Primary | ICD-10-CM

## 2023-12-19 DIAGNOSIS — R05.9 COUGH, UNSPECIFIED TYPE: ICD-10-CM

## 2023-12-19 DIAGNOSIS — K21.9 GASTROESOPHAGEAL REFLUX DISEASE, UNSPECIFIED WHETHER ESOPHAGITIS PRESENT: ICD-10-CM

## 2023-12-19 LAB — ERYTHROPOIETIN: 5.4 MIU/ML

## 2023-12-19 PROCEDURE — 99213 OFFICE O/P EST LOW 20 MIN: CPT | Performed by: INTERNAL MEDICINE

## 2023-12-19 PROCEDURE — 3008F BODY MASS INDEX DOCD: CPT | Performed by: SPECIALIST

## 2023-12-19 PROCEDURE — 99213 OFFICE O/P EST LOW 20 MIN: CPT | Performed by: SPECIALIST

## 2023-12-19 RX ORDER — LOSARTAN POTASSIUM 50 MG/1
TABLET ORAL
COMMUNITY
Start: 2023-12-17

## 2023-12-19 RX ORDER — AMOXICILLIN 875 MG/1
875 TABLET, COATED ORAL 2 TIMES DAILY
Qty: 20 TABLET | Refills: 0 | Status: SHIPPED | OUTPATIENT
Start: 2023-12-19

## 2023-12-20 NOTE — PATIENT INSTRUCTIONS
Refill of your amoxicillin was sent to the pharmacy for your cough. Continue diet control for your reflux disease. No greasy foods, spicy foods, chocolate, caffeine, alcohol, spearmint, peppermint, lemon, lime, orange, grapefruit, tomatoes. Don't eat 2 hours before bedtime  Elevate the head of bed   Follow-up with any additional questions or problems.

## 2024-01-16 ENCOUNTER — TELEPHONE (OUTPATIENT)
Dept: OTOLARYNGOLOGY | Facility: CLINIC | Age: 39
End: 2024-01-16

## 2024-01-16 RX ORDER — OMEPRAZOLE 40 MG/1
40 CAPSULE, DELAYED RELEASE ORAL DAILY
Qty: 30 CAPSULE | Refills: 2 | Status: SHIPPED | OUTPATIENT
Start: 2024-01-16

## 2024-01-16 NOTE — TELEPHONE ENCOUNTER
Alternative requested  Medication needs to be dispensed as a 90 day supply  Per insurance requirements  Provide additional refills if appropriate        Current Outpatient Medications:     Omeprazole 40 MG Oral Capsule Delayed Release, Take 1 capsule (40 mg total) by mouth daily. Before a meal (Patient not taking: Reported on 11/29/2023), Disp: 30 capsule, Rfl: 2

## 2024-01-25 ENCOUNTER — OFFICE VISIT (OUTPATIENT)
Dept: SLEEP CENTER | Age: 39
End: 2024-01-25
Attending: INTERNAL MEDICINE
Payer: COMMERCIAL

## 2024-01-25 DIAGNOSIS — G47.33 OSA (OBSTRUCTIVE SLEEP APNEA): ICD-10-CM

## 2024-01-25 PROCEDURE — 95806 SLEEP STUDY UNATT&RESP EFFT: CPT

## 2024-01-27 NOTE — PROCEDURES
[facility]  Accredited by the American Academy of Sleep Medicine (AASM)    PATIENT'S NAME: PATRICIA WASSERMAN   ATTENDING PHYSICIAN: Sue Neville MD   REFERRING PHYSICIAN: Sue Neville MD   PATIENT ACCOUNT #: [id number] LOCATION: Sleep Center   MEDICAL RECORD #: [id number] YOB: 1985   DATE OF STUDY: 01/25/2024       SLEEP STUDY REPORT    STUDY TYPE:  Home sleep test.    INDICATION:  Suspected obstructive sleep apnea (ICD-10 code G47.33) in a patient with witnessed apneic events, snoring, elevated BMI at 39.2, nocturia, and an [place] Sleepiness Scale score of 15/24.      RESULTS:  The patient underwent home sleep test with measurement of his nasal airflow, nasal air pressure, snoring, chest and abdominal wall motion, oximetry, and body position.  I have reviewed the entirety of the raw data of this study.      During the study, the total recording time is 484 minutes.  The lights-out clock time is 7:02 p.m., the lights-on clock time is 3:07 a.m.  The apnea plus hypopnea index is 13.9 events per hour and this mily to 25 events per hour in the supine position.  The average oxygen saturation is 95%, the minimum oxygen saturation is 88%, and the patient spent 0.3% of the testing with saturations 88% or less.  The average heart rate is 66 beats per minute, and the patient spent approximately 20% of the test in the supine position.    INTERPRETATION:  The data generated from this study is consistent with mild obstructive sleep apnea which becomes moderately severe in the supine position.    RECOMMENDATIONS:    1.   Proceed to CPAP titration in this symptomatic patient.    2.   Weight loss.   3.   Avoid alcohol.   4.   Avoid sedating drug.   5.   Patient should not drive if at all sleepy.    Please do not hesitate to contact me if there is any question whatsoever regarding interpretation of this study.    Dictated By Karthik Sagastume MD  d: 01/26/2024 18:04:31  t: 01/26/2024  18:19:38  [facility] [id number]  [facility]/    cc: MD Delores Pichardo MD

## 2024-02-15 ENCOUNTER — TELEPHONE (OUTPATIENT)
Dept: PULMONOLOGY | Facility: CLINIC | Age: 39
End: 2024-02-15

## 2024-02-15 DIAGNOSIS — G47.33 OSA (OBSTRUCTIVE SLEEP APNEA): Primary | ICD-10-CM

## 2024-02-15 NOTE — TELEPHONE ENCOUNTER
----- Message from Sue Neville MD sent at 2/13/2024  4:35 PM CST -----  Please inform the patient that his sleep study showed obstructive sleep apnea    Please order for the patient auto CPAP 4-14 CWP with mask fitting    Follow-up with me 6 to 8 weeks after initiating therapy

## 2024-02-16 ENCOUNTER — OFFICE VISIT (OUTPATIENT)
Dept: PULMONOLOGY | Facility: CLINIC | Age: 39
End: 2024-02-16
Payer: COMMERCIAL

## 2024-02-16 VITALS
DIASTOLIC BLOOD PRESSURE: 82 MMHG | OXYGEN SATURATION: 95 % | BODY MASS INDEX: 38.22 KG/M2 | HEART RATE: 96 BPM | SYSTOLIC BLOOD PRESSURE: 123 MMHG | WEIGHT: 267 LBS | HEIGHT: 70 IN

## 2024-02-16 DIAGNOSIS — G47.33 OSA (OBSTRUCTIVE SLEEP APNEA): ICD-10-CM

## 2024-02-16 DIAGNOSIS — R05.9 COUGH, UNSPECIFIED TYPE: Primary | ICD-10-CM

## 2024-02-16 PROCEDURE — 3079F DIAST BP 80-89 MM HG: CPT | Performed by: INTERNAL MEDICINE

## 2024-02-16 PROCEDURE — 99214 OFFICE O/P EST MOD 30 MIN: CPT | Performed by: INTERNAL MEDICINE

## 2024-02-16 PROCEDURE — 3008F BODY MASS INDEX DOCD: CPT | Performed by: INTERNAL MEDICINE

## 2024-02-16 PROCEDURE — 3074F SYST BP LT 130 MM HG: CPT | Performed by: INTERNAL MEDICINE

## 2024-02-16 NOTE — PROGRESS NOTES
Subjective:   Patient ID: Aditya Copeland is a 38 year old male.    HPI  Cough completely resolved and much better and now stopped using Symbicort and he is completely asymptomatic from this regard with no cough or fever or chills no dyspnea or dyspnea upon exertion.    Reported snoring and daytime mild to moderate sleepiness and fatigue and not refreshed in the morning with no daytime sleep attack or sleep paralysis or restless leg symptoms  History/Other:   Review of Systems   Constitutional:  Positive for fatigue.   HENT: Negative.     Respiratory:  Positive for apnea. Negative for cough, shortness of breath, wheezing and stridor.    Cardiovascular: Negative.    Gastrointestinal: Negative.    Genitourinary: Negative.    Musculoskeletal: Negative.    Neurological: Negative.    Hematological: Negative.    Psychiatric/Behavioral: Negative.       Current Outpatient Medications   Medication Sig Dispense Refill    Omeprazole 40 MG Oral Capsule Delayed Release Take 1 capsule (40 mg total) by mouth daily. Before a meal (Patient not taking: Reported on 2/16/2024) 30 capsule 2    losartan 50 MG Oral Tab  (Patient not taking: Reported on 2/16/2024)      amoxicillin 875 MG Oral Tab Take 1 tablet (875 mg total) by mouth 2 (two) times daily. (Patient not taking: Reported on 2/16/2024) 20 tablet 0    azithromycin 250 MG Oral Tab take 2 tablet (500MG)  by ORAL route  every day for 1 day then 1 tablet (250 mg) by oral route once daily for 4 days (Patient not taking: Reported on 12/19/2023) 6 tablet 0    Budesonide-Formoterol Fumarate (SYMBICORT) 160-4.5 MCG/ACT Inhalation Aerosol Inhale 2 puffs into the lungs 2 (two) times daily. (Patient not taking: Reported on 12/19/2023) 1 each 1    benzonatate 200 MG Oral Cap Take 1 capsule (200 mg total) by mouth 3 (three) times daily as needed for cough. (Patient not taking: Reported on 12/19/2023) 30 capsule 1     Allergies:No Known Allergies    Objective:   Physical Exam  Constitutional:        General: He is not in acute distress.     Appearance: He is obese. He is not ill-appearing.   HENT:      Head: Normocephalic and atraumatic.      Nose: Nose normal.      Mouth/Throat:      Mouth: Mucous membranes are moist.      Comments: Upper airway class III Mallampati score  Eyes:      General: No scleral icterus.  Cardiovascular:      Rate and Rhythm: Normal rate.      Heart sounds: No murmur heard.     No gallop.   Pulmonary:      Effort: Pulmonary effort is normal. No respiratory distress.      Breath sounds: No stridor. No wheezing, rhonchi or rales.   Abdominal:      General: Abdomen is flat.      Palpations: Abdomen is soft.   Musculoskeletal:      Right lower leg: No edema.      Left lower leg: No edema.   Skin:     General: Skin is dry.   Neurological:      Mental Status: He is oriented to person, place, and time. Mental status is at baseline.         Assessment & Plan:   1. Cough, unspecified type    2. EKATERINA (obstructive sleep apnea)      1- Mild EKATERINA becomes severe during REM  BMI 39 with upper airway class III Mallampati score  symptomatic     Plan :  Educated about EKATERINA and CPAP therapy  Start auto CPAP 4-14 CWP with mask fitting     Diet and exercise and weight reduction  Avoid driving if sleepy  Avoid sedative and narcotic and alcohol      2- s/p subacute dry cough which is likely reactive airway  No history of smoking or vaping  No birds or cats and no occupational exposure  Chest x-ray clear with normal PFTs    Resolved with Z-Ramakrishna and Symbicort  Now asymptomatic normal exam  Continue Symbicort as needed          F/u in 3 months       Meds This Visit:  Requested Prescriptions      No prescriptions requested or ordered in this encounter       Imaging & Referrals:  None

## 2024-02-16 NOTE — TELEPHONE ENCOUNTER
Auto CPAP order, face sheet, home sleep study and office visit notes faxed to [facility] at [phone].  Fax confirmation received.

## 2024-02-22 ENCOUNTER — TELEPHONE (OUTPATIENT)
Dept: PULMONOLOGY | Facility: CLINIC | Age: 39
End: 2024-02-22

## 2024-02-22 NOTE — TELEPHONE ENCOUNTER
Received fax from [facility] for patient's CPAP. Placed in Dr Neville's folder for signature.

## 2024-02-27 NOTE — TELEPHONE ENCOUNTER
Dr Neville signed order. Faxed to [facility]. Fax confirmation received and sent to scanning.

## 2024-03-01 ENCOUNTER — TELEPHONE (OUTPATIENT)
Dept: PULMONOLOGY | Facility: CLINIC | Age: 39
End: 2024-03-01

## 2024-03-01 NOTE — TELEPHONE ENCOUNTER
Received fax from [facility] [place] requesting signature for Cpap supplies. Placed on Dr. Neville folder for review.

## 2024-06-03 ENCOUNTER — OFFICE VISIT (OUTPATIENT)
Dept: PULMONOLOGY | Facility: CLINIC | Age: 39
End: 2024-06-03

## 2024-06-03 VITALS
BODY MASS INDEX: 37.8 KG/M2 | DIASTOLIC BLOOD PRESSURE: 91 MMHG | SYSTOLIC BLOOD PRESSURE: 146 MMHG | HEART RATE: 65 BPM | RESPIRATION RATE: 12 BRPM | WEIGHT: 264 LBS | OXYGEN SATURATION: 100 % | HEIGHT: 70 IN

## 2024-06-03 DIAGNOSIS — G47.33 OSA (OBSTRUCTIVE SLEEP APNEA): Primary | ICD-10-CM

## 2024-06-03 PROCEDURE — 3077F SYST BP >= 140 MM HG: CPT | Performed by: INTERNAL MEDICINE

## 2024-06-03 PROCEDURE — 3080F DIAST BP >= 90 MM HG: CPT | Performed by: INTERNAL MEDICINE

## 2024-06-03 PROCEDURE — 3008F BODY MASS INDEX DOCD: CPT | Performed by: INTERNAL MEDICINE

## 2024-06-03 PROCEDURE — 99214 OFFICE O/P EST MOD 30 MIN: CPT | Performed by: INTERNAL MEDICINE

## 2024-06-03 NOTE — PROGRESS NOTES
Subjective:   Patient ID: Aditya Copeland is a 39 year old male.    HPI    Rarely using CPAP with no technical issue  No sinus pressure or nasal congestion  Regular time in bed  Possible some improvement with the use of CPAP but overall he only used it for minimal time  Denies any sleep attack or sleep paralysis  Otherwise denies any chest pain or shortness of breath or cough  History/Other:   Review of Systems   Constitutional:  Negative for fever.   HENT:  Negative for congestion.    Respiratory:  Negative for cough and shortness of breath.    Cardiovascular: Negative.    Gastrointestinal: Negative.    Psychiatric/Behavioral: Negative.       Current Outpatient Medications   Medication Sig Dispense Refill    Omeprazole 40 MG Oral Capsule Delayed Release Take 1 capsule (40 mg total) by mouth daily. Before a meal (Patient not taking: Reported on 2/16/2024) 30 capsule 2    losartan 50 MG Oral Tab  (Patient not taking: Reported on 2/16/2024)      amoxicillin 875 MG Oral Tab Take 1 tablet (875 mg total) by mouth 2 (two) times daily. (Patient not taking: Reported on 2/16/2024) 20 tablet 0    azithromycin 250 MG Oral Tab take 2 tablet (500MG)  by ORAL route  every day for 1 day then 1 tablet (250 mg) by oral route once daily for 4 days (Patient not taking: Reported on 12/19/2023) 6 tablet 0    Budesonide-Formoterol Fumarate (SYMBICORT) 160-4.5 MCG/ACT Inhalation Aerosol Inhale 2 puffs into the lungs 2 (two) times daily. (Patient not taking: Reported on 12/19/2023) 1 each 1    benzonatate 200 MG Oral Cap Take 1 capsule (200 mg total) by mouth 3 (three) times daily as needed for cough. (Patient not taking: Reported on 12/19/2023) 30 capsule 1     Allergies:No Known Allergies    Objective:   Physical Exam  Constitutional:       General: He is not in acute distress.     Appearance: He is obese. He is not ill-appearing.   HENT:      Head: Atraumatic.      Nose: Nose normal.      Mouth/Throat:      Mouth: Mucous membranes are  moist.      Comments: Upper airway class III Mallampati score  Eyes:      General: No scleral icterus.  Cardiovascular:      Rate and Rhythm: Normal rate.      Heart sounds:      No gallop.   Pulmonary:      Effort: No respiratory distress.      Breath sounds: No stridor. No wheezing, rhonchi or rales.   Abdominal:      General: Abdomen is flat. Bowel sounds are normal.      Palpations: Abdomen is soft.      Tenderness: There is no guarding.   Musculoskeletal:      Cervical back: Normal range of motion.      Right lower leg: No edema.      Left lower leg: No edema.   Skin:     General: Skin is dry.   Neurological:      Mental Status: He is oriented to person, place, and time.         Assessment & Plan:   1. EKATERINA (obstructive sleep apnea)      1- Mild EKATERINA becomes severe during REM  BMI 39 with upper airway class III Mallampati score  Started on auto CPAP 4-14 CWP  Effective therapy with normal AHI with the use of CPAP  Poor compliance and adherence with no technical problem     Plan :  Educated about EKATERINA and CPAP therapy  Encouraged the patient to use CPAP every night all night     Diet and exercise and weight reduction  Avoid driving if sleepy  Avoid sedative and narcotic and alcohol        2- h/o cough which is likely reactive airway  Resolved with Z-Ramakrishna and Symbicort and now off inhalers and asymptomatic   No history of smoking or vaping  Chest x-ray clear with normal PFTs      F/u in 6 months       Meds This Visit:  Requested Prescriptions      No prescriptions requested or ordered in this encounter       Imaging & Referrals:  None

## 2024-10-18 ENCOUNTER — OFFICE VISIT (OUTPATIENT)
Dept: INTERNAL MEDICINE CLINIC | Facility: CLINIC | Age: 39
End: 2024-10-18
Payer: COMMERCIAL

## 2024-10-18 ENCOUNTER — EKG ENCOUNTER (OUTPATIENT)
Dept: LAB | Age: 39
End: 2024-10-18
Attending: STUDENT IN AN ORGANIZED HEALTH CARE EDUCATION/TRAINING PROGRAM
Payer: COMMERCIAL

## 2024-10-18 ENCOUNTER — HOSPITAL ENCOUNTER (OUTPATIENT)
Dept: GENERAL RADIOLOGY | Age: 39
Discharge: HOME | End: 2024-10-18
Attending: STUDENT IN AN ORGANIZED HEALTH CARE EDUCATION/TRAINING PROGRAM
Payer: COMMERCIAL

## 2024-10-18 VITALS
HEIGHT: 70 IN | SYSTOLIC BLOOD PRESSURE: 139 MMHG | HEART RATE: 73 BPM | DIASTOLIC BLOOD PRESSURE: 87 MMHG | BODY MASS INDEX: 40.37 KG/M2 | WEIGHT: 282 LBS

## 2024-10-18 DIAGNOSIS — M79.672 LEFT FOOT PAIN: ICD-10-CM

## 2024-10-18 DIAGNOSIS — Z12.5 SCREENING FOR PROSTATE CANCER: ICD-10-CM

## 2024-10-18 DIAGNOSIS — Z00.00 ANNUAL PHYSICAL EXAM: ICD-10-CM

## 2024-10-18 DIAGNOSIS — N52.8 OTHER MALE ERECTILE DYSFUNCTION: ICD-10-CM

## 2024-10-18 DIAGNOSIS — E55.9 VITAMIN D DEFICIENCY: ICD-10-CM

## 2024-10-18 DIAGNOSIS — I10 ESSENTIAL HYPERTENSION: ICD-10-CM

## 2024-10-18 DIAGNOSIS — Z13.6 ENCOUNTER FOR SCREENING FOR CORONARY ARTERY DISEASE: ICD-10-CM

## 2024-10-18 DIAGNOSIS — E66.813 CLASS 3 OBESITY: Chronic | ICD-10-CM

## 2024-10-18 DIAGNOSIS — R68.82 LOW LIBIDO: ICD-10-CM

## 2024-10-18 DIAGNOSIS — Z00.00 ANNUAL PHYSICAL EXAM: Primary | ICD-10-CM

## 2024-10-18 LAB
ALBUMIN SERPL-MCNC: 5.1 G/DL (ref 3.2–4.8)
ALBUMIN/GLOB SERPL: 1.8 {RATIO} (ref 1–2)
ALP LIVER SERPL-CCNC: 59 U/L
ALT SERPL-CCNC: 50 U/L
ANION GAP SERPL CALC-SCNC: 5 MMOL/L (ref 0–18)
AST SERPL-CCNC: 23 U/L (ref ?–34)
ATRIAL RATE: 57 BPM
BASOPHILS # BLD AUTO: 0.09 X10(3) UL (ref 0–0.2)
BASOPHILS NFR BLD AUTO: 1.4 %
BILIRUB SERPL-MCNC: 0.7 MG/DL (ref 0.3–1.2)
BUN BLD-MCNC: 14 MG/DL (ref 9–23)
BUN/CREAT SERPL: 12.4 (ref 10–20)
CALCIUM BLD-MCNC: 10 MG/DL (ref 8.7–10.4)
CHLORIDE SERPL-SCNC: 105 MMOL/L (ref 98–112)
CHOLEST SERPL-MCNC: 240 MG/DL (ref ?–200)
CO2 SERPL-SCNC: 31 MMOL/L (ref 21–32)
COMPLEXED PSA SERPL-MCNC: 0.51 NG/ML (ref ?–4)
CREAT BLD-MCNC: 1.13 MG/DL
CREAT UR-SCNC: 237.1 MG/DL
DEPRECATED RDW RBC AUTO: 41.3 FL (ref 35.1–46.3)
EGFRCR SERPLBLD CKD-EPI 2021: 84 ML/MIN/1.73M2 (ref 60–?)
EOSINOPHIL # BLD AUTO: 0.29 X10(3) UL (ref 0–0.7)
EOSINOPHIL NFR BLD AUTO: 4.4 %
ERYTHROCYTE [DISTWIDTH] IN BLOOD BY AUTOMATED COUNT: 12.6 % (ref 11–15)
EST. AVERAGE GLUCOSE BLD GHB EST-MCNC: 120 MG/DL (ref 68–126)
FASTING PATIENT LIPID ANSWER: YES
FASTING STATUS PATIENT QL REPORTED: YES
GLOBULIN PLAS-MCNC: 2.9 G/DL (ref 2–3.5)
GLUCOSE BLD-MCNC: 96 MG/DL (ref 70–99)
HBA1C MFR BLD: 5.8 % (ref ?–5.7)
HCT VFR BLD AUTO: 50.2 %
HDLC SERPL-MCNC: 47 MG/DL (ref 40–59)
HGB BLD-MCNC: 16.9 G/DL
IMM GRANULOCYTES # BLD AUTO: 0.02 X10(3) UL (ref 0–1)
IMM GRANULOCYTES NFR BLD: 0.3 %
LDLC SERPL CALC-MCNC: 177 MG/DL (ref ?–100)
LYMPHOCYTES # BLD AUTO: 2.16 X10(3) UL (ref 1–4)
LYMPHOCYTES NFR BLD AUTO: 32.5 %
MCH RBC QN AUTO: 30.1 PG (ref 26–34)
MCHC RBC AUTO-ENTMCNC: 33.7 G/DL (ref 31–37)
MCV RBC AUTO: 89.5 FL
MICROALBUMIN UR-MCNC: 0.4 MG/DL
MICROALBUMIN/CREAT 24H UR-RTO: 1.7 UG/MG (ref ?–30)
MONOCYTES # BLD AUTO: 0.42 X10(3) UL (ref 0.1–1)
MONOCYTES NFR BLD AUTO: 6.3 %
NEUTROPHILS # BLD AUTO: 3.67 X10 (3) UL (ref 1.5–7.7)
NEUTROPHILS # BLD AUTO: 3.67 X10(3) UL (ref 1.5–7.7)
NEUTROPHILS NFR BLD AUTO: 55.1 %
NONHDLC SERPL-MCNC: 193 MG/DL (ref ?–130)
OSMOLALITY SERPL CALC.SUM OF ELEC: 292 MOSM/KG (ref 275–295)
P AXIS: 38 DEGREES
P-R INTERVAL: 158 MS
PLATELET # BLD AUTO: 246 10(3)UL (ref 150–450)
POTASSIUM SERPL-SCNC: 4.5 MMOL/L (ref 3.5–5.1)
PROT SERPL-MCNC: 8 G/DL (ref 5.7–8.2)
Q-T INTERVAL: 392 MS
QRS DURATION: 94 MS
QTC CALCULATION (BEZET): 381 MS
R AXIS: 55 DEGREES
RBC # BLD AUTO: 5.61 X10(6)UL
SODIUM SERPL-SCNC: 141 MMOL/L (ref 136–145)
T AXIS: 29 DEGREES
TRIGL SERPL-MCNC: 92 MG/DL (ref 30–149)
TSI SER-ACNC: 1.36 MIU/ML (ref 0.55–4.78)
URATE SERPL-MCNC: 6.4 MG/DL
VENTRICULAR RATE: 57 BPM
VIT D+METAB SERPL-MCNC: 18.5 NG/ML (ref 30–100)
VLDLC SERPL CALC-MCNC: 19 MG/DL (ref 0–30)
WBC # BLD AUTO: 6.7 X10(3) UL (ref 4–11)

## 2024-10-18 PROCEDURE — 82306 VITAMIN D 25 HYDROXY: CPT

## 2024-10-18 PROCEDURE — 36415 COLL VENOUS BLD VENIPUNCTURE: CPT

## 2024-10-18 PROCEDURE — 84443 ASSAY THYROID STIM HORMONE: CPT

## 2024-10-18 PROCEDURE — 82043 UR ALBUMIN QUANTITATIVE: CPT

## 2024-10-18 PROCEDURE — 84402 ASSAY OF FREE TESTOSTERONE: CPT

## 2024-10-18 PROCEDURE — 84403 ASSAY OF TOTAL TESTOSTERONE: CPT

## 2024-10-18 PROCEDURE — 85025 COMPLETE CBC W/AUTO DIFF WBC: CPT

## 2024-10-18 PROCEDURE — 93005 ELECTROCARDIOGRAM TRACING: CPT

## 2024-10-18 PROCEDURE — 84550 ASSAY OF BLOOD/URIC ACID: CPT

## 2024-10-18 PROCEDURE — 80061 LIPID PANEL: CPT

## 2024-10-18 PROCEDURE — 82570 ASSAY OF URINE CREATININE: CPT

## 2024-10-18 PROCEDURE — 93010 ELECTROCARDIOGRAM REPORT: CPT | Performed by: STUDENT IN AN ORGANIZED HEALTH CARE EDUCATION/TRAINING PROGRAM

## 2024-10-18 PROCEDURE — 73630 X-RAY EXAM OF FOOT: CPT | Performed by: STUDENT IN AN ORGANIZED HEALTH CARE EDUCATION/TRAINING PROGRAM

## 2024-10-18 PROCEDURE — 80053 COMPREHEN METABOLIC PANEL: CPT

## 2024-10-18 PROCEDURE — 83036 HEMOGLOBIN GLYCOSYLATED A1C: CPT

## 2024-10-18 RX ORDER — LOSARTAN POTASSIUM 50 MG/1
50 TABLET ORAL DAILY
Qty: 90 TABLET | Refills: 3 | Status: SHIPPED | OUTPATIENT
Start: 2024-10-18 | End: 2025-10-13

## 2024-10-18 NOTE — PROGRESS NOTES
History of Present Illness   Patient ID: Aditya Copeland is a 39 year old male.  Chief Complaint: Physical and Foot Pain (Sharp burning feeling bottom of left foot x2 weeks)      Aditya Copeland is a pleasant 39 year old male with PMHx of HTN, Class 3 obesity 282lbs (Body mass index is 40.46 kg/m².)   EKATERINA ((followed by Dr. Neville),ACL tear ED, who presents for annual physical exam. Aditya Copeland is doing well today also acute concern for left foot pain.    Pt noticed left foot pain on walking, running.    Past few months lost about 10 pounds with lifestyle changes trying to eat better    Has EKATERINA but does not like wearing CPAP mask         Health Maintenance  - All care gaps addressed with patient.   Health Maintenance Due   Topic Date Due    Asthma Control Test  Never done    HTN: BP Follow-Up  07/03/2024    Annual Physical  10/18/2024     Review of Systems  Review of Systems   Constitutional:  Positive for fatigue.   Respiratory: Negative.     Cardiovascular: Negative.    Gastrointestinal: Negative.    Skin: Negative.    Neurological: Negative.        Physical Exam  Vitals:    10/18/24 0845   BP: 139/87   Pulse: 73   Weight: 282 lb (127.9 kg)   Height: 5' 10\" (1.778 m)     Body mass index is 40.46 kg/m².  BP Readings from Last 3 Encounters:   10/18/24 139/87   06/03/24 (!) 146/91   02/16/24 123/82     Physical Exam  Constitutional:       Appearance: He is well-developed. He is obese.   Cardiovascular:      Rate and Rhythm: Normal rate and regular rhythm.      Heart sounds: Normal heart sounds.   Pulmonary:      Effort: Pulmonary effort is normal.      Breath sounds: Normal breath sounds.   Abdominal:      General: Bowel sounds are normal.      Palpations: Abdomen is soft.   Feet:      Comments: Left foot tenderness    Skin:     General: Skin is warm and dry.   Neurological:      Mental Status: He is alert and oriented to person, place, and time.      Deep Tendon Reflexes: Reflexes are normal and symmetric.            Labs & Imaging  Pertinent labs and imaging reviewed.   Lab Results   Component Value Date    GLU 98 10/04/2023    BUN 18 10/04/2023    BUNCREA 14.4 10/04/2023    CREATSERUM 1.25 10/04/2023    ANIONGAP 8 10/04/2023    GFRNAA >60 02/01/2016    GFRAA >60 02/01/2016    CA 9.5 10/04/2023    OSMOCALC 296 (H) 10/04/2023    ALKPHO 66 10/04/2023    AST 27 10/04/2023    ALT 62 (H) 10/04/2023    ALKPHOS 51 02/01/2016    BILT 0.6 10/04/2023    TP 8.3 (H) 10/04/2023    ALB 4.2 10/04/2023    GLOBULIN 4.1 10/04/2023    AGRATIO 1.7 02/01/2016     10/04/2023    K 4.3 10/04/2023     10/04/2023    CO2 28.0 10/04/2023     Lab Results   Component Value Date     11/09/2023    A1C 5.7 (H) 11/09/2023     Lab Results   Component Value Date    WBC 6.9 12/16/2023    RBC 5.61 12/16/2023    HGB 16.9 12/16/2023    HCT 48.2 12/16/2023    MCV 85.9 12/16/2023    MCH 30.1 12/16/2023    MCHC 35.1 12/16/2023    RDW 12.1 12/16/2023    .0 12/16/2023    MPV 9.4 02/01/2016     Lab Results   Component Value Date    CHOLEST 252 (H) 10/04/2023    TRIG 180 (H) 10/04/2023    HDL 54 10/04/2023     (H) 10/04/2023    VLDL 36 (H) 10/04/2023    NONHDLC 198 (H) 10/04/2023    CALCNONHDL 146 (H) 02/01/2016     The 10-year ASCVD risk score (Maddi SANDERS, et al., 2019) is: 2.4%    Values used to calculate the score:      Age: 39 years      Sex: Male      Is Non- : No      Diabetic: No      Tobacco smoker: No      Systolic Blood Pressure: 139 mmHg      Is BP treated: Yes      HDL Cholesterol: 54 mg/dL      Total Cholesterol: 252 mg/dL    Medical History    Reviewed allergies:  Allergies[1]     Reviewed:  Patient Active Problem List    Diagnosis    Acute cough    Malaise and fatigue    Erectile dysfunction    Essential hypertension    ACL tear      Reviewed:  Past Medical History:    Sleep apnea      Reviewed:  Family History   Problem Relation Age of Onset    No Known Problems Mother     Stroke Father 52     Hypertension Father     Other (tobacco use) Father     Breast Cancer Paternal Grandmother     Colon Cancer Paternal Grandfather 86    Blood Cancer Paternal Grandfather        Reviewed:  Past Surgical History:   Procedure Laterality Date    Knee arthroscopy  2014    X3 ACL bilateral.      Reviewed:  Social History     Socioeconomic History    Marital status:    Tobacco Use    Smoking status: Never     Passive exposure: Never    Smokeless tobacco: Never   Vaping Use    Vaping status: Never Used   Substance and Sexual Activity    Alcohol use: Yes     Alcohol/week: 0.0 standard drinks of alcohol     Comment: social    Drug use: Never    Sexual activity: Yes     Partners: Female      Reviewed:  Current Outpatient Medications   Medication Sig Dispense Refill    losartan 50 MG Oral Tab Take 1 tablet (50 mg total) by mouth daily. 90 tablet 3          Assessment & Plan    1. Annual physical exam  - CT CALCIUM SCORING; Future  - EKG 12 Lead; Future  - PSA Total, Screen; Future  - Lipid Panel; Future  - TSH W Reflex To Free T4; Future  - Hemoglobin A1C; Future  - Comp Metabolic Panel (14); Future  - CBC With Differential With Platelet; Future  - Vitamin D; Future  - Microalb/Creat Ratio, Random Urine; Future  Patient here for physical exam and due for following.  Plan:  -order the following Labs: CBC, CMP, Lipid Panel, A1C, TSH, Vitamin D,  PSA in males.   -Obtain Baseline EKG  -Order screening mammgram and reflex to ultrasound/biopsy if indicated  -Discussed benefit for Screening for Cardiac CT scan for evaluation of cardiac arthrosclerosis, ordered Calcium CT scan, should be repeated every 3 years. If abnormal will refer to cardiology.         2. Essential hypertension  - Microalb/Creat Ratio, Random Urine; Future  - losartan 50 MG Oral Tab; Take 1 tablet (50 mg total) by mouth daily.  Dispense: 90 tablet; Refill: 3  Pt with EKATERINA and HTN  Plan  -check CMP, urine microalbumin  -start losartan 50mg nightly  -check BP in  am and keep log,   -follow up in 3 months with log      3. Low libido  - Testosterone, Total And Free [E]; Future  Check Testosterone  Plan:  Encourage strength training as natural testosterone booster    4. Other male erectile dysfunction  Monitor for now.   Check Testosterone  Plan:  Encourage strength training as natural testosterone booster    5. Encounter for screening for coronary artery disease  - CT CALCIUM SCORING; Future  -Discussed benefit for Screening for Cardiac CT scan for evaluation of cardiac arthrosclerosis, ordered Calcium CT scan, should be repeated every 3 years. If abnormal will refer to cardiology.     6. Screening for prostate cancer  - PSA Total, Screen; Future  Check screening PSA    7. Vitamin D deficiency  - Vitamin D; Future  Pt with Vitamin D def due for screening:  Plan;  -Check vitamin D level, depending on level will give guidance on what dose to recommend per guidelines.      8. Left foot pain  - XR FOOT, COMPLETE (MIN 3 VIEWS), LEFT (CPT=73630); Future  - Podiatry Referral - In Network  Pt with left foot pain  Plan  -check left foot xray  -given link  to get foot roller ball   -follow up with podiatry    9. Class 3 obesity    Lifestyle Changes Recommendations:   Nutritional Goals Reviewed and Discussed:   Limit Carbohydrates to 100gm per day, Eat 100-200 calories within 1 hour of awkaing up, Eat 3-4 cups of fresh fruits or vegetables daily    Behavior Modification Reviewed and Discussed:  Eat breakfast, Eat 3 meals per day, Plan meals in advance (if unable to cook, meal prep service such as [id number]) High protein, Low simple carbs. Read nutrition labels track calories and Macros with [employer] or [employer] champ (thus daily food journal), No drinking 30mintutes before or after meals, utilize portion control strategies to reduce caloric intake, Identify triggers for eating and manage cues, and Eat Slowly and take 20-30 minutes to complete each meal.    Goal for Next Month:  Keep Food  log: At first track current daily caloric intake and limit current caloric consumption by 500 to 1,000 calories daily OR start with caloric restriction of less than 1,800 calories daily.   Increase Cardiac/cardio exercise at least 30minutes a day/ 180 minutes a week, ideal Goal is 1hr a day (5 days a week) would prefer if enrolls in fitness classes or  at least 1x a week. (If possible to work out in the morning is proven to increase natural Dopamine, Serotonin, Endorphin, levels (Feel good and energy hormones) and reduce cortisol  (stress hormone levels).   Drink 48-64 ounces of non-caloric beverages per day. No fruit juices or regular soda.  Increase activity- upper body exercises in addition to cardio and, aim to walk 10minutes per day after dinner  Increase fruit and vegetable servings to 5-6 per day.   6. Food recommendation for Breakfast: Steel cut oatmeal:  1cup Steel cut oatmeal and 2cups unsweetened almond milk. And cinnamon (let it ,overnight in a bowl), and portion out 4 servings (separate containers/jars), then daily add one triple zero okios greek yogurt, 1 medium banana and however many berries your heart desires  All berries are good, (blueberry, raspberry, strawberries, blackberries).   -avoid high sugar fruits (pineapple, mangos, melons, banana)- high  7. Plant based protein: Ascent Plant Based Protein Powder - Non Dairy Vegan Protein, Zero Artificial Ingredients, Soy & Gluten Free, No Added Sugar, 4g BCAA, 2g Leucine - Chocolate, 18 Servings          Follow Up:   Return in about 3 months (around 1/18/2025).      Sancho Orta MD  Internal Medicine  Patient asked to sign release of information for outside records if not already requested, make future office/imaging appointments at the  prior to leaving, and to sign up for [employer] if not already active.  Preventive measures and further education discussed with patient as per after visit summary. Potential medication side  effects discussed. All questions answered to best of ability.   Call office with any questions. Seek emergency care if necessary.   Patient understands and agrees to follow directions and advice.      ----------------------------------------- PATIENT INSTRUCTIONS-----------------------------------------     Patient Instructions     Lifestyle Changes Recommendations:   Nutritional Goals Reviewed and Discussed:   Limit Carbohydrates to 100gm per day, Eat 100-200 calories within 1 hour of awkaing up, Eat 3-4 cups of fresh fruits or vegetables daily    Behavior Modification Reviewed and Discussed:  Eat breakfast, Eat 3 meals per day, Plan meals in advance (if unable to cook, meal prep service such as [employer]) High protein, Low simple carbs. Read nutrition labels track calories and Macros with [employer] or [employer] champ (thus daily food journal), No drinking 30mintutes before or after meals, utilize portion control strategies to reduce caloric intake, Identify triggers for eating and manage cues, and Eat Slowly and take 20-30 minutes to complete each meal.    Goal for Next Month:  Keep Food log: At first track current daily caloric intake and limit current caloric consumption by 500 to 1,000 calories daily OR start with caloric restriction of less than 1,800 calories daily.   Increase Cardiac/cardio exercise at least 30minutes a day/ 180 minutes a week, ideal Goal is 1hr a day (5 days a week) would prefer if enrolls in fitness classes or  at least 1x a week. (If possible to work out in the morning is proven to increase natural Dopamine, Serotonin, Endorphin, levels (Feel good and energy hormones) and reduce cortisol  (stress hormone levels).   Drink 48-64 ounces of non-caloric beverages per day. No fruit juices or regular soda.  Increase activity- upper body exercises in addition to cardio and, aim to walk 10minutes per day after dinner  Increase fruit and vegetable servings to 5-6 per day.   6. Food  recommendation for Breakfast: Steel cut oatmeal:  1cup Steel cut oatmeal and 2cups unsweetened almond milk. And cinnamon (let it ,overnight in a bowl), and portion out 4 servings (separate containers/jars), then daily add one triple zero okios greek yogurt, 1 medium banana and however many berries your heart desires  All berries are good, (blueberry, raspberry, strawberries, blackberries).   -avoid high sugar fruits (pineapple, mangos, melons, banana)- high  7. Plant based protein: Ascent Plant Based Protein Powder - Non Dairy Vegan Protein, Zero Artificial Ingredients, Soy & Gluten Free, No Added Sugar, 4g BCAA, 2g Leucine - Chocolate, 18 Servings          The 21st Century Cures Act makes medical notes available to patients in the interest of transparency.  However, please be advised that this is a medical document.  It is intended as a peer to peer communication.  It is written in medical language and may contain abbreviations or verbiage that are technical and unfamiliar.  It may appear blunt or direct.  Medical documents are intended to carry relevant information, facts as evident, and the clinical opinion of the practitioner.          [1] No Known Allergies

## 2024-10-18 NOTE — PROGRESS NOTES
Please relay to pt:    Yosef Mr. Copeland, your left foot xray shows nothing at the arch of the foot. But do have  signs of possible arthritis or gout at base of left toe. I am checking a uric acid to see if you might have gout.     Please make sure to follow up with podiatrist for further evaluation of heel/arch/any foot pain.  -Dr. Orta

## 2024-10-18 NOTE — PATIENT INSTRUCTIONS
Lifestyle Changes Recommendations:   Nutritional Goals Reviewed and Discussed:   Limit Carbohydrates to 100gm per day, Eat 100-200 calories within 1 hour of awkaing up, Eat 3-4 cups of fresh fruits or vegetables daily    Behavior Modification Reviewed and Discussed:  Eat breakfast, Eat 3 meals per day, Plan meals in advance (if unable to cook, meal prep service such as [id number]) High protein, Low simple carbs. Read nutrition labels track calories and Macros with [employer] or [employer] champ (thus daily food journal), No drinking 30mintutes before or after meals, utilize portion control strategies to reduce caloric intake, Identify triggers for eating and manage cues, and Eat Slowly and take 20-30 minutes to complete each meal.    Goal for Next Month:  Keep Food log: At first track current daily caloric intake and limit current caloric consumption by 500 to 1,000 calories daily OR start with caloric restriction of less than 1,800 calories daily.   Increase Cardiac/cardio exercise at least 30minutes a day/ 180 minutes a week, ideal Goal is 1hr a day (5 days a week) would prefer if enrolls in fitness classes or  at least 1x a week. (If possible to work out in the morning is proven to increase natural Dopamine, Serotonin, Endorphin, levels (Feel good and energy hormones) and reduce cortisol  (stress hormone levels).   Drink 48-64 ounces of non-caloric beverages per day. No fruit juices or regular soda.  Increase activity- upper body exercises in addition to cardio and, aim to walk 10minutes per day after dinner  Increase fruit and vegetable servings to 5-6 per day.   6. Food recommendation for Breakfast: Steel cut oatmeal:  1cup Steel cut oatmeal and 2cups unsweetened almond milk. And cinnamon (let it ,overnight in a bowl), and portion out 4 servings (separate containers/jars), then daily add one triple zero okios greek yogurt, 1 medium banana and however many berries your heart desires  All berries are  good, (blueberry, raspberry, strawberries, blackberries).   -avoid high sugar fruits (pineapple, mangos, melons, banana)- high  7. Plant based protein: Ascent Plant Based Protein Powder - Non Dairy Vegan Protein, Zero Artificial Ingredients, Soy & Gluten Free, No Added Sugar, 4g BCAA, 2g Leucine - Chocolate, 18 Servings

## 2024-10-19 NOTE — PROGRESS NOTES
Please relay to pt:    Yosef There!     Dr. Orta here, I have reviewed your labs here are your recommendations:    # Lipids/cholesterol: Your cholesterol levels are dangerously elevated  (190 is the cut off for mandatory high intensity cholesterol treatment), personally I would recommend rosuvastatin 10mg nightly at least to consider.     Your ASCVD, ie 10-year risk score which indicates the potential chance that you could have a heart attack, stroke or death related to cholesterol/heart disease, is LOWER than the 5% cut off, thus cholesterol medications, ie statins, are not required to be started at this time, unless you would like to do so to prevent heart disease from starting. Please proceed with the heart scan/calcium score. We will monitor your cholesterols yearly with your annual. Please continue with exercise and mediterranean/low carb diet.      The 10-year ASCVD risk score (Maddi SANDERS, et al., 2019) is: 2.7%    Values used to calculate the score:      Age: 39 years      Sex: Male      Is Non- : No      Diabetic: No      Tobacco smoker: No      Systolic Blood Pressure: 139 mmHg      Is BP treated: Yes      HDL Cholesterol: 47 mg/dL      Total Cholesterol: 240 mg/dL    # Diabetes/A1C: Your A1C Last A1c value was 5.8% done 10/18/2024. which shows  prediabetes. This does not require medications unless your A1C reaches greater than 6.5, but if you would like to start medications to prevent the progression to diabetes please let me know. I would recommend increasing exercise and/or reducing your intake of carbohydrates, pastas, sweets, and sugary drinks/etc, drink more water, eat more vegetables instead of fruit, and try to lose 10% of your current bodyweight.  We will recheck your A1C in 3 months, please schedule a follow up office visit so we can recheck your A1C in the office using a finger stick test. You do not need to fast. If you prefer a video visit let me know so we can  order a lab draw A1C/metabolic panel to be completed 3 days prior to our visit.     # TSH: Your thyroid (TSH) function is normal.     # CMP: Your comprehensive metabolic panel shows overall stable functioning kidneys (creatinine, GFR), liver (AST, ALT, Bilirubin), and electrolytes (sodium, potassium, calcium). Slight variations in other values such as BUN/Creat, Serum Osm, anion gap, chloride, etc are not of clinical value at this time.     # CBC: Your complete blood count shows overall stable red blood cells, white blood cells, platelets (help you stop bleeding), and hematocrit (thickness of blood),  Slight variations in other values such as RDW/sw, MCH are not of clinical value at this time.       # Vitamins: Your vitamin D level is Vitamin D Insufficiency (<30ng/mL) please start 2,000 International Units daily (it is cheaper to purchase from [facility] or your local pharmacy rather than sending a prescription in). will recheck with next annual physical or sooner if clinically indicated      # Prostate: Your prostate level, ie PSA, is normal.     #Uric acid is 6.4. ideally this should be below 6 to rule out gout. If you have isolated joint pain/swelling of foot then perhaps you might have gout.     Your testosterone levels are pending , I will send a follow up [employer] message once those result.     If you have any questions or concerns in regards to these labs please schedule a follow up and will gladly discuss.   -Dr. Orta

## 2024-10-23 LAB
FREE TESTOST DIRECT: 8.8 PG/ML
TESTOSTERONE: 369 NG/DL

## 2024-11-19 ENCOUNTER — HOSPITAL ENCOUNTER (OUTPATIENT)
Dept: CT IMAGING | Age: 39
Discharge: HOME | End: 2024-11-19
Attending: STUDENT IN AN ORGANIZED HEALTH CARE EDUCATION/TRAINING PROGRAM

## 2024-11-19 DIAGNOSIS — Z13.6 ENCOUNTER FOR SCREENING FOR CORONARY ARTERY DISEASE: ICD-10-CM

## 2024-11-19 DIAGNOSIS — Z00.00 ANNUAL PHYSICAL EXAM: ICD-10-CM

## 2024-11-19 NOTE — PROGRESS NOTES
Date of Service 2024    PATRICIA WASSERMAN  Date of Birth 5/3/1985    Patient Age: 39 year old    PCP: Sancho Orta MD  [address] [place] [address]  [facility] [id number]    Heart Scan Consult  Preliminary Heart Scan Score: 0    Previous Screening  Heart Scan Completed Previously: No                   Risk Factors  Personal Risk Factors  Non-alterable Risk Factors: Family History (Father had a stroke at 52 yo and .)  Alterable Risk Factors: High Blood Pressure;Abnormal Cholesterol;Pre-Diabetes;Lack of exercise;Unhealthy eating;Obesity;Obstructive sleep apnea      Body Mass Index  BMI 40    Blood Pressure  /87 not on medication.  (Normal =< 120/80,  Elevated = 120-129/ >80,  High Stage1 130-139/80-89 , Stage2 >140/>90)    Lipid Profile  Cholesterol: 240, done on 10/18/2024.  HDL Cholesterol: 47, done on 10/18/2024.  LDL Cholesterol: 177, done on 10/18/2024.  TriGlycerides 92, done on 10/18/2024.  He is not on medication.    Decrease the saturated fats in your diet to try and lower the LDL.  Saturated fats are:  red meat - beef, limon, sausage, dairy products - milk, cheese, butter, etc., egg yolks (the egg white has no cholesterol) fried food and fast food.  Increased fiber may help lower LDL - fruits, vegetables, oatmeal.  Can also try over the counter fiber products like Metamucil, Benefiber.    Cholesterol Goals  Value   Total  =< 200   HDL  = > 45 Men = > 55 Women   LDL   =< 100   Triglycerides  =< 150       Glucose and Hemoglobin A1C  Pre diabetes - not on medication.  Lab Results   Component Value Date    GLU 96 10/18/2024    A1C 5.8 (H) 10/18/2024     (Normal Fasting Glucose < 100mg/dl )    Nurse Review  Risk factor information and results reviewed with Nurse: Yes    Recommended Follow Up:  Consult your physician regarding:: Final Heart Scan Report;Discuss potential for Incidental Finding      Recommendations for Change:  Nutrition Changes: Low Saturated Fat;Low Fat Dairy;Low Salt Eating;Increase  Fiber    Cholesterol Modification (goal of therapy depends upon your risk): Decrease LDL (Lousy/Bad) Ideal <100    Exercise: Initiate Program    Smoking Cessation: No Change Needed    Weight Management: Decrease Current Weight    Stress Management: Adopt Stress Management Techniques    Repeat Heart Scan: 5 years if Calcium Score is 0.0;Discuss with your Physician              [place] Recommended Resources:  Recommended Resources: Upcoming Classes, Medical Services and Health Library [email or web address]            Geno HULL RN        Please Contact the Nurse Heart Line with any Questions or Concerns [phone].

## 2024-11-20 NOTE — PROGRESS NOTES
Please relay to pt if not read:    Your CT Calcium score over-read, which shows the other organs in your chest cavity and is included with your recently competed heart scan is overall normal.     The results of the heart scan/calcium score may take up to 2 weeks to be reviewed by the cardiologist. If you were provided with a preliminary score and have questions or concerns, please reach out to my office with your preliminary score in hand.     -Dr. Orta

## 2024-11-30 DIAGNOSIS — E78.2 MIXED HYPERLIPIDEMIA: Primary | ICD-10-CM

## 2024-11-30 RX ORDER — ROSUVASTATIN CALCIUM 10 MG/1
10 TABLET, COATED ORAL NIGHTLY
Qty: 90 TABLET | Refills: 3 | Status: SHIPPED | OUTPATIENT
Start: 2024-11-30

## 2025-01-22 ENCOUNTER — OFFICE VISIT (OUTPATIENT)
Dept: INTERNAL MEDICINE CLINIC | Facility: CLINIC | Age: 40
End: 2025-01-22
Payer: COMMERCIAL

## 2025-01-22 VITALS
DIASTOLIC BLOOD PRESSURE: 84 MMHG | HEIGHT: 70 IN | HEART RATE: 74 BPM | SYSTOLIC BLOOD PRESSURE: 137 MMHG | BODY MASS INDEX: 39.8 KG/M2 | WEIGHT: 278 LBS

## 2025-01-22 DIAGNOSIS — E66.813 CLASS 3 OBESITY: ICD-10-CM

## 2025-01-22 DIAGNOSIS — H93.12 TINNITUS, LEFT EAR: ICD-10-CM

## 2025-01-22 DIAGNOSIS — R73.03 PRE-DIABETES: Primary | ICD-10-CM

## 2025-01-22 DIAGNOSIS — F90.9 ATTENTION DEFICIT HYPERACTIVITY DISORDER (ADHD), UNSPECIFIED ADHD TYPE: ICD-10-CM

## 2025-01-22 DIAGNOSIS — I10 ESSENTIAL HYPERTENSION: ICD-10-CM

## 2025-01-22 LAB
CARTRIDGE EXPIRATION DATE: ABNORMAL DATE
HEMOGLOBIN A1C: 5.8 % (ref 4.3–5.6)

## 2025-01-22 PROCEDURE — 83036 HEMOGLOBIN GLYCOSYLATED A1C: CPT | Performed by: STUDENT IN AN ORGANIZED HEALTH CARE EDUCATION/TRAINING PROGRAM

## 2025-01-22 PROCEDURE — 3075F SYST BP GE 130 - 139MM HG: CPT | Performed by: STUDENT IN AN ORGANIZED HEALTH CARE EDUCATION/TRAINING PROGRAM

## 2025-01-22 PROCEDURE — 99215 OFFICE O/P EST HI 40 MIN: CPT | Performed by: STUDENT IN AN ORGANIZED HEALTH CARE EDUCATION/TRAINING PROGRAM

## 2025-01-22 PROCEDURE — 3079F DIAST BP 80-89 MM HG: CPT | Performed by: STUDENT IN AN ORGANIZED HEALTH CARE EDUCATION/TRAINING PROGRAM

## 2025-01-22 PROCEDURE — 3008F BODY MASS INDEX DOCD: CPT | Performed by: STUDENT IN AN ORGANIZED HEALTH CARE EDUCATION/TRAINING PROGRAM

## 2025-01-22 RX ORDER — LOSARTAN POTASSIUM 50 MG/1
50 TABLET ORAL NIGHTLY
Qty: 90 TABLET | Refills: 3 | Status: SHIPPED | OUTPATIENT
Start: 2025-01-22 | End: 2026-01-17

## 2025-01-22 NOTE — PROGRESS NOTES
OFFICE NOTE     Patient ID: Aditya Copeland is a 39 year old male.  Today's Date: 01/22/25  Chief Complaint: Pre-diabetes and Hypertension    Pt is a 40y/o with PMHx of Pre-diabetes, HTN, and Class 2 obesity here for BP follow up and obesity  Hi BP still not at goal, not taking losartan 50mg and not taking rosuvastatin       Weight 278    Tinnitus left ear     adhd      Vitals:    01/22/25 0857 01/22/25 0905   BP: (!) 149/99 137/84   Pulse: 73 74   Weight: 276 lb (125.2 kg) 278 lb (126.1 kg)   Height: 5' 10\" (1.778 m)      body mass index is 39.89 kg/m².  BP Readings from Last 3 Encounters:   01/22/25 137/84   10/18/24 139/87   06/03/24 (!) 146/91     The 10-year ASCVD risk score (Maddi SANDERS, et al., 2019) is: 2.6%    Values used to calculate the score:      Age: 39 years      Sex: Male      Is Non- : No      Diabetic: No      Tobacco smoker: No      Systolic Blood Pressure: 137 mmHg      Is BP treated: Yes      HDL Cholesterol: 47 mg/dL      Total Cholesterol: 240 mg/dL      Medications reviewed:  Current Outpatient Medications   Medication Sig Dispense Refill    losartan 50 MG Oral Tab Take 1 tablet (50 mg total) by mouth at bedtime. 90 tablet 3         Assessment & Plan    1. Pre-diabetes (Primary)  -     POC Hemoglobin A1C  Last A1c value was 5.8% done 1/22/2025.   I recommend to eat a more balanced mediterranean diet (eat more vegetables and fruits) more omega 3 fatty acids, lean protein (chicken, turkey, seafood), less process/fried fatty foods, less red met, and mixed aerobic exercise 30 minutes a day and intermittent strength training.      2. Essential hypertension  -     Losartan Potassium; Take 1 tablet (50 mg total) by mouth at bedtime.  Dispense: 90 tablet; Refill: 3  Educated importance of taking medication at night and check BP in am  Plan  -start losartan 50mg nightly  -check BP in am  -follow up in 3 months    3. Tinnitus, left ear  -     ENT Referral - In  Network  Refer to ENT for audiometry testing and evaluation    4. Attention deficit hyperactivity disorder (ADHD), unspecified ADHD type  -     [facility] Referral - In Network  Pt with suspected ADHD, refer to Aiden bejarano for evaluation/testing  5. Class 3 obesity    Lifestyle Changes Recommendations:   Nutritional Goals Reviewed and Discussed:   Limit Carbohydrates to 100gm per day, Eat 100-200 calories within 1 hour of awkaing up, Eat 3-4 cups of fresh fruits or vegetables daily    Behavior Modification Reviewed and Discussed:  Eat breakfast, Eat 3 meals per day, Plan meals in advance (if unable to cook, meal prep service such as [employer]) High protein, Low simple carbs. Read nutrition labels track calories and Macros with [employer] or [employer] champ (thus daily food journal), No drinking 30mintutes before or after meals, utilize portion control strategies to reduce caloric intake, Identify triggers for eating and manage cues, and Eat Slowly and take 20-30 minutes to complete each meal.    Goal for Next Month:  Keep Food log: At first track current daily caloric intake and limit current caloric consumption by 500 to 1,000 calories daily OR start with caloric restriction of less than 1,800 calories daily.   Increase Cardiac/cardio exercise at least 30minutes a day/ 180 minutes a week, ideal Goal is 1hr a day (5 days a week) would prefer if enrolls in fitness classes or  at least 1x a week. (If possible to work out in the morning is proven to increase natural Dopamine, Serotonin, Endorphin, levels (Feel good and energy hormones) and reduce cortisol  (stress hormone levels).   Drink 48-64 ounces of non-caloric beverages per day. No fruit juices or regular soda.  Increase activity- upper body exercises in addition to cardio and, aim to walk 10minutes per day after dinner  Increase fruit and vegetable servings to 5-6 per day.   6. Food recommendation for Breakfast: Steel cut oatmeal:   1cup Steel cut oatmeal and 2cups unsweetened almond milk. And cinnamon (let it ,overnight in a bowl), and portion out 4 servings (separate containers/jars), then daily add one triple zero okios greek yogurt, 1 medium banana and however many berries your heart desires  All berries are good, (blueberry, raspberry, strawberries, blackberries).   -avoid high sugar fruits (pineapple, mangos, melons, banana)- high  7. Plant based protein: Ascent Plant Based Protein Powder - Non Dairy Vegan Protein, Zero Artificial Ingredients, Soy & Gluten Free, No Added Sugar, 4g BCAA, 2g Leucine - Chocolate, 18 Servings      Follow Up: As needed/if symptoms worsen or Return in about 3 months (around 4/22/2025) for Obesity follow up (Zepbound/Wegovy) vs compouneded Goal weight 264..     Objective/ Results:   Physical Exam  Constitutional:       Appearance: He is well-developed.   Cardiovascular:      Rate and Rhythm: Normal rate and regular rhythm.      Heart sounds: Normal heart sounds.   Pulmonary:      Effort: Pulmonary effort is normal.      Breath sounds: Normal breath sounds.   Abdominal:      General: Bowel sounds are normal.      Palpations: Abdomen is soft.   Skin:     General: Skin is warm and dry.   Neurological:      Mental Status: He is alert and oriented to person, place, and time.      Deep Tendon Reflexes: Reflexes are normal and symmetric.        Reviewed:    Patient Active Problem List    Diagnosis    Mixed hyperlipidemia    Acute cough    Malaise and fatigue    Erectile dysfunction    Essential hypertension    ACL tear      Allergies[1]     Social History     Socioeconomic History    Marital status:    Tobacco Use    Smoking status: Never     Passive exposure: Never    Smokeless tobacco: Never   Vaping Use    Vaping status: Never Used   Substance and Sexual Activity    Alcohol use: Yes     Alcohol/week: 0.0 standard drinks of alcohol     Comment: social    Drug use: Never    Sexual activity: Yes     Partners:  Female      Review of Systems   HENT:  Positive for tinnitus (left ear).      All other systems negative unless otherwise stated in ROS or HPI above.       Sancho Orta MD  Internal Medicine       Call office with any questions or seek emergency care if necessary.   Patient understands and agrees to follow directions and advice.      ----------------------------------------- PATIENT INSTRUCTIONS-----------------------------------------     Patient Instructions       Lifestyle Changes Recommendations:   Nutritional Goals Reviewed and Discussed:   Limit Carbohydrates to 100gm per day, Eat 100-200 calories within 1 hour of awkaing up, Eat 3-4 cups of fresh fruits or vegetables daily    Behavior Modification Reviewed and Discussed:  Eat breakfast, Eat 3 meals per day, Plan meals in advance (if unable to cook, meal prep service such as [employer]) High protein, Low simple carbs. Read nutrition labels track calories and Macros with [employer] or [facility] champ (thus daily food journal), No drinking 30mintutes before or after meals, utilize portion control strategies to reduce caloric intake, Identify triggers for eating and manage cues, and Eat Slowly and take 20-30 minutes to complete each meal.    Goal for Next Month:  Keep Food log: At first track current daily caloric intake and limit current caloric consumption by 500 to 1,000 calories daily OR start with caloric restriction of less than 1,800 calories daily.   Increase Cardiac/cardio exercise at least 30minutes a day/ 180 minutes a week, ideal Goal is 1hr a day (5 days a week) would prefer if enrolls in fitness classes or  at least 1x a week. (If possible to work out in the morning is proven to increase natural Dopamine, Serotonin, Endorphin, levels (Feel good and energy hormones) and reduce cortisol  (stress hormone levels).   Drink 48-64 ounces of non-caloric beverages per day. No fruit juices or regular soda.  Increase activity- upper body exercises  in addition to cardio and, aim to walk 10minutes per day after dinner  Increase fruit and vegetable servings to 5-6 per day.   6. Food recommendation for Breakfast: Steel cut oatmeal:  1cup Steel cut oatmeal and 2cups unsweetened almond milk. And cinnamon (let it ,overnight in a bowl), and portion out 4 servings (separate containers/jars), then daily add one triple zero okios greek yogurt, 1 medium banana and however many berries your heart desires  All berries are good, (blueberry, raspberry, strawberries, blackberries).   -avoid high sugar fruits (pineapple, mangos, melons, banana)- high  7. Plant based protein: Ascent Plant Based Protein Powder - Non Dairy Vegan Protein, Zero Artificial Ingredients, Soy & Gluten Free, No Added Sugar, 4g BCAA, 2g Leucine - Chocolate, 18 Servings          The 21st Century Cures Act makes medical notes available to patients in the interest of transparency.  However, please be advised that this is a medical document.  It is intended as a peer to peer communication.  It is written in medical language and may contain abbreviations or verbiage that are technical and unfamiliar.  It may appear blunt or direct.  Medical documents are intended to carry relevant information, facts as evident, and the clinical opinion of the practitioner.          [1] No Known Allergies

## 2025-01-22 NOTE — PATIENT INSTRUCTIONS
Lifestyle Changes Recommendations:   Nutritional Goals Reviewed and Discussed:   Limit Carbohydrates to 100gm per day, Eat 100-200 calories within 1 hour of awkaing up, Eat 3-4 cups of fresh fruits or vegetables daily    Behavior Modification Reviewed and Discussed:  Eat breakfast, Eat 3 meals per day, Plan meals in advance (if unable to cook, meal prep service such as [id number]) High protein, Low simple carbs. Read nutrition labels track calories and Macros with [employer] or [employer] champ (thus daily food journal), No drinking 30mintutes before or after meals, utilize portion control strategies to reduce caloric intake, Identify triggers for eating and manage cues, and Eat Slowly and take 20-30 minutes to complete each meal.    Goal for Next Month:  Keep Food log: At first track current daily caloric intake and limit current caloric consumption by 500 to 1,000 calories daily OR start with caloric restriction of less than 1,800 calories daily.   Increase Cardiac/cardio exercise at least 30minutes a day/ 180 minutes a week, ideal Goal is 1hr a day (5 days a week) would prefer if enrolls in fitness classes or  at least 1x a week. (If possible to work out in the morning is proven to increase natural Dopamine, Serotonin, Endorphin, levels (Feel good and energy hormones) and reduce cortisol  (stress hormone levels).   Drink 48-64 ounces of non-caloric beverages per day. No fruit juices or regular soda.  Increase activity- upper body exercises in addition to cardio and, aim to walk 10minutes per day after dinner  Increase fruit and vegetable servings to 5-6 per day.   6. Food recommendation for Breakfast: Steel cut oatmeal:  1cup Steel cut oatmeal and 2cups unsweetened almond milk. And cinnamon (let it ,overnight in a bowl), and portion out 4 servings (separate containers/jars), then daily add one triple zero okios greek yogurt, 1 medium banana and however many berries your heart desires  All berries  are good, (blueberry, raspberry, strawberries, blackberries).   -avoid high sugar fruits (pineapple, mangos, melons, banana)- high  7. Plant based protein: Ascent Plant Based Protein Powder - Non Dairy Vegan Protein, Zero Artificial Ingredients, Soy & Gluten Free, No Added Sugar, 4g BCAA, 2g Leucine - Chocolate, 18 Servings

## 2025-01-24 ENCOUNTER — TELEPHONE (OUTPATIENT)
Age: 40
End: 2025-01-24

## 2025-01-27 ENCOUNTER — TELEPHONE (OUTPATIENT)
Age: 40
End: 2025-01-27

## 2025-02-19 ENCOUNTER — OFFICE VISIT (OUTPATIENT)
Dept: INTERNAL MEDICINE CLINIC | Facility: CLINIC | Age: 40
End: 2025-02-19
Payer: COMMERCIAL

## 2025-02-19 VITALS
SYSTOLIC BLOOD PRESSURE: 128 MMHG | WEIGHT: 273 LBS | BODY MASS INDEX: 39.08 KG/M2 | HEIGHT: 70 IN | DIASTOLIC BLOOD PRESSURE: 84 MMHG | HEART RATE: 82 BPM

## 2025-02-19 DIAGNOSIS — I10 ESSENTIAL HYPERTENSION: Primary | ICD-10-CM

## 2025-02-19 PROCEDURE — 3074F SYST BP LT 130 MM HG: CPT | Performed by: STUDENT IN AN ORGANIZED HEALTH CARE EDUCATION/TRAINING PROGRAM

## 2025-02-19 PROCEDURE — 3079F DIAST BP 80-89 MM HG: CPT | Performed by: STUDENT IN AN ORGANIZED HEALTH CARE EDUCATION/TRAINING PROGRAM

## 2025-02-19 PROCEDURE — 99214 OFFICE O/P EST MOD 30 MIN: CPT | Performed by: STUDENT IN AN ORGANIZED HEALTH CARE EDUCATION/TRAINING PROGRAM

## 2025-02-19 PROCEDURE — 3008F BODY MASS INDEX DOCD: CPT | Performed by: STUDENT IN AN ORGANIZED HEALTH CARE EDUCATION/TRAINING PROGRAM

## 2025-02-19 RX ORDER — LOSARTAN POTASSIUM 50 MG/1
50 TABLET ORAL NIGHTLY
Qty: 90 TABLET | Refills: 3 | Status: SHIPPED | OUTPATIENT
Start: 2025-02-19 | End: 2026-02-14

## 2025-02-19 NOTE — PATIENT INSTRUCTIONS
Controlling High Blood Pressure   High blood pressure (hypertension) is often called the silent killer. This is because many people who have it, don’t know it. It can be very dangerous. High blood pressure can raise your risk of heart attack, stroke, heart disease, and heart failure. Controlling your blood pressure can lower your risk of these problems. It's important to check yourblood pressure regularly. It can save your life.   Blood pressure measurements are given as 2 numbers. Systolic blood pressure is the upper number. This is the pressure when the heart contracts. Diastolic blood pressure is the lower number. This is the pressure when the heartrelaxes between beats.   Blood pressure is grouped like this:   Normal blood pressure. This is systolic of less than 120 and diastolic of less than 80 (120/80).  Elevated blood pressure.  This is systolic of 120 to 129 and diastolic less than 80.  Stage 1 high blood pressure.  This is systolic of 130 to 139 or diastolic between 80 to 89.  Stage 2 high blood pressure.  This is systolic of 140 or higher or diastolic of 90 or higher.  A heart-healthy lifestyle can help you control your blood pressure withoutmedicines. Below are some things you can do to have a heart-healthy lifestyle.     Eat heart-healthy foods   Choose low-salt, low-fat foods. Limit your sodium to 2,300 mg per day or the amount advised by your healthcare provider.  Limit canned, dried, cured, packaged, and fast foods. These can contain a lot of salt.  Eat 8 to 10 servings of fruits and vegetables every day.  Choose lean meats, fish, or chicken.  Eat whole-grain pasta, brown rice, and beans.  Eat 2 to 3 servings of low-fat or fat-free dairy products.  Ask your doctor about the DASH eating plan. This plan helps reduce blood pressure.  When you go to a restaurant, ask that your meal be made with no added salt.    Stay at a healthy weight   Ask your healthcare provider how many calories to eat a day. Then  stick to that number.  Ask your provider what weight range is healthiest for you. If you are overweight, a weight loss of only 3% to 5% of your body weight can help lower blood pressure. A good weight loss goal is to lose 10% of your body weight in a year.  Limit snacks and sweets.  Get regular exercise.    Get more active   Find activities you enjoy. They can be done alone or with friends or family. Try bicycling, dancing, walking, or jogging.  Park farther away from building entrances to walk more.  Use stairs instead of the elevator.  When you can, walk or bike instead of driving.  [place] leaves, garden, or do household repairs.  Be active at a moderate to vigorous level of physical activity for at least 30 minutes a day for at least 5 days a week.     Manage stress   Make time to relax and enjoy life. Find time to laugh.  Talk about your concerns with your loved ones and your healthcare provider.  Visit with family and friends, and keep up with hobbies.    Limit alcohol and quit smoking   Men should have no more than 2 drinks per day.  Women should have no more than 1 drink per day.  If you smoke, make a plan to stop. Talk with your healthcare provider for help. Smoking greatly raises your risk for heart disease and stroke. Ask your provider about stop-smoking programs and other support.    Blood pressure medicines  If your lifestyle changes aren’t enough, your healthcare provider may prescribe high blood pressure medicine. Take all medicines as prescribed. If you have any questions about yourmedicines, ask your provider before stopping or changing them.   [employer] last reviewed this educational content on12/1/2021 © 2000-2022 [employer]. All rights reserved. This information is not intended as a substitute for professional medical care. Always follow yourMercy Health Fairfield Hospitalcare professional's instruction    Video [employer]™  What is High Blood Pressure?  Understand what blood pressure is, the health risks  of having high blood pressure, the factors that put you at risk for having high blood pressure, and the importance of working with your healthcare provider to control it.  To watch the video:  Scan the QR code  Using your mobile device, scan the following code:  OR  Go to the website:  [facility] [facility]  Enter the prescription code:  3414E    © 2000-2022 [employer]. All rights reserved. This information is not intended as a substitute for professional medical care. Always follow your healthcare professional's instructions.    Video [employer]  Eating Well with High Blood Pressure  Certain foods can make your blood pressure go too high. Watch and learn how easy it is to have delicious meals without harming your health.     To watch the video:  Scan the QR code  Using your mobile device, scan the following code:  OR  Go to the website:  [email or web address].[email or web address]  Enter the prescription code:   QIX       © 2000-2022 [employer]. All rights reserved. This information is not intended as a substitute for professional medical care. Always follow your healthcare professional's instructions.    Taking Your Blood Pressure  Blood pressure is the force of blood against the artery wall as it moves from the heart through the blood vessels. You can take your own blood pressure reading using a digital monitor. Take your readings the same each time, usingthe same arm. Take readings as often as your healthcare provider advises.   About blood pressure monitors  Blood pressure monitors are designed for certain ages and cases. You can find monitors for older adults, for pregnant women, and for children. Make sure theone you choose is the right one for your age and situation.   Experts advise an automatic cuff monitor that fits on your upper arm (bicep). The cuff should fit your arm size. A cuff that’s too large or too small won't give an accurate reading. Measure around yourupper arm to find your  size.   Monitors that attach to your finger or wrist are not as accurate as monitorsfor your upper arm.   Ask your healthcare provider for help in choosing a monitor. Bring your monitorto your next provider visit if you need help in using it the correct way.   The steps below are general instructions for using an automatic digitalmonitor.   Step 1. Relax    Take your blood pressure at the same time every day, such as in the morning or evening. Or take it at the time your healthcare provider advises.  Wait at least 30 minutes after smoking, eating, or exercising. Don't drink coffee, tea, soda, or other caffeinated drinks before checking your blood pressure. Use the restroom beforehand.  Sit comfortably at a table with both feet on the floor. Don't cross your legs or feet. Place the monitor near you.  Rest for at least 5 minutes before you begin. Make sure there are no distractions. This includes TV, cell phones, and other electronics. Wait to have conversations with others until after you measure you blood pressure.  Step 2. Wrap the cuff    Place your arm on the table, palm up. Your arm should be at the level of your heart. Wrap the cuff around your upper arm, just above your elbow. It’s best done on bare skin, not over clothing. Most cuffs will show you where the blood vessel in the middle of the arm at the inner side of the elbow (the brachial artery) should line up with the cuff. Look in your monitor's instruction booklet for an illustration. You can also bring your cuff to your healthcare provider and have them show you how to correctly place the cuff.  Step 3. Inflate the cuff    Push the button that starts the pump.  The cuff will tighten, then loosen.  The numbers will change. When they stop changing, your blood pressure reading will appear.  Take 2 or 3 readings 1 minute apart, or as advised by your provider.  Step 4. Write down the results of each reading    Write down your blood pressure numbers for each  reading. Note the date and time. Keep your results in 1 place, such as a notebook. Even if your monitor has a built-in memory, keep a hard copy of the readings.  Remove the cuff from your arm. Turn off the machine.  Bring your blood pressure records with you to each provider visit.  If you start a new blood pressure medicine, note the day you started the new medicine. Also note the day if you change the dose of your medicine. Measure your blood pressure before your take your medicine. This information goes on your blood pressure recording sheet. This will help your provider check how well the medicine changes are working.  Ask your provider what numbers mean that you should call them. Also ask what numbers mean that you should get help right away.  Carlos Alberto last reviewed this educational content on12/1/2021 © 2000-2022 [employer]. All rights reserved. This information is not intended as a substitute for professional medical care. Always follow yourhealthcare professional's instructions.

## 2025-02-19 NOTE — PROGRESS NOTES
OFFICE NOTE       The following individual(s) verbally consented to be recorded using ambient AI listening technology and understand that they can each withdraw their consent to this listening technology at any point by asking the clinician to turn off or pause the recording:    Patient name: Aditya Copeland  Additional names:            Patient ID: Aditya Copeland is a 39 year old male.  Today's Date: 02/19/25  Chief Complaint: Hypertension and ADHD    Hypertension    ADHD      History of Present Illness  Aditya Copeland is a 39 year old male with hypertension who presents for blood pressure management and clearance for ADHD medication. He was referred by Dr. Hahn for clearance to start ADHD medication due to concerns about high blood pressure.    He was recently diagnosed with ADHD combined type by his psychiatrist, who recommended starting a stimulant medication. Due to concerns about his high blood pressure, clearance from his primary care provider was requested before initiating the medication.    He monitors his blood pressure at home six out of seven days a week, with readings mostly below 135/88 mmHg and an average of 128/88 mmHg. He takes losartan 50 mg only when his morning blood pressure exceeds 135 mmHg, which has occurred a few times since his last visit. He has recently modified his diet to improve blood pressure control.    An EKG performed in October 2024 showed sinus bradycardia. He is not taking losartan consistently but plans to take it regularly if he starts the ADHD medication.         Vitals:    02/19/25 0938   Weight: 273 lb (123.8 kg)   Height: 5' 10\" (1.778 m)     body mass index is 39.17 kg/m².  BP Readings from Last 3 Encounters:   01/22/25 137/84   10/18/24 139/87   06/03/24 (!) 146/91     The 10-year ASCVD risk score (Maddi SANDERS, et al., 2019) is: 2.6%    Values used to calculate the score:      Age: 39 years      Sex: Male      Is Non- : No      Diabetic: No       Tobacco smoker: No      Systolic Blood Pressure: 137 mmHg      Is BP treated: Yes      HDL Cholesterol: 47 mg/dL      Total Cholesterol: 240 mg/dL  Results  DIAGNOSTIC  EKG: sinus bradycardia (10/2024)       Medications reviewed:  Current Outpatient Medications   Medication Sig Dispense Refill    losartan 50 MG Oral Tab Take 1 tablet (50 mg total) by mouth at bedtime. (Patient taking differently: Take 1 tablet (50 mg total) by mouth at bedtime. Patient states only took 2-3 times since last visit, only takes medication when bp over 140) 90 tablet 3         Assessment & Plan    There are no diagnoses linked to this encounter.  Assessment & Plan  Hypertension  Patient has been taking Losartan 50mg intermittently based on home blood pressure readings. Anticipate increase in blood pressure with initiation of ADHD medication.  -Advise consistent daily use of Losartan 50mg while on ADHD medication.  -If blood pressure consistently above 130/80, patient to notify provider for possible dose adjustment.    ADHD  Recently diagnosed and planning to start stimulant medication. EKG shows sinus bradycardia, no contraindication to stimulant use from cardiac standpoint.  -This MD approval of [facility] Starting ADHD medication as directed by psychiatrist, so long as he takes his losartan 50mg daily, and if he notices if persistently above 130/80 will follow up for dose titration, along with lifestyle changes.   -Continue monitoring blood pressure at home.    Follow-up in March/April to reassess blood pressure control and response to ADHD medication.       Follow Up: As needed/if symptoms worsen or No follow-ups on file..         Objective/ Results:   Physical Exam  Constitutional:       Appearance: He is well-developed. He is obese.   Cardiovascular:      Rate and Rhythm: Normal rate and regular rhythm.      Heart sounds: Normal heart sounds.   Pulmonary:      Effort: Pulmonary effort is normal.      Breath sounds: Normal breath sounds.    Abdominal:      General: Bowel sounds are normal.      Palpations: Abdomen is soft.   Skin:     General: Skin is warm and dry.   Neurological:      Mental Status: He is alert and oriented to person, place, and time.      Deep Tendon Reflexes: Reflexes are normal and symmetric.        Physical Exam  VITALS: BP- 128/86     Reviewed:    Patient Active Problem List    Diagnosis    Mixed hyperlipidemia    Acute cough    Malaise and fatigue    Erectile dysfunction    Essential hypertension    ACL tear      Allergies[1]     Social History     Socioeconomic History    Marital status:    Tobacco Use    Smoking status: Never     Passive exposure: Never    Smokeless tobacco: Never   Vaping Use    Vaping status: Never Used   Substance and Sexual Activity    Alcohol use: Yes     Alcohol/week: 0.0 standard drinks of alcohol     Comment: social    Drug use: Never    Sexual activity: Yes     Partners: Female      Review of Systems   Constitutional: Negative.    Respiratory: Negative.     Cardiovascular: Negative.    Gastrointestinal: Negative.    Skin: Negative.    Neurological: Negative.        All other systems negative unless otherwise stated in ROS or HPI above.       Sancho Orta MD  Internal Medicine       Call office with any questions or seek emergency care if necessary.   Patient understands and agrees to follow directions and advice.      ----------------------------------------- PATIENT INSTRUCTIONS-----------------------------------------     There are no Patient Instructions on file for this visit.        The 21st Century Cures Act makes medical notes available to patients in the interest of transparency.  However, please be advised that this is a medical document.  It is intended as a peer to peer communication.  It is written in medical language and may contain abbreviations or verbiage that are technical and unfamiliar.  It may appear blunt or direct.  Medical documents are intended to carry relevant  information, facts as evident, and the clinical opinion of the practitioner.          [1] No Known Allergies

## 2025-03-03 ENCOUNTER — OFFICE VISIT (OUTPATIENT)
Dept: AUDIOLOGY | Facility: CLINIC | Age: 40
End: 2025-03-03

## 2025-03-03 ENCOUNTER — OFFICE VISIT (OUTPATIENT)
Dept: OTOLARYNGOLOGY | Facility: CLINIC | Age: 40
End: 2025-03-03
Payer: COMMERCIAL

## 2025-03-03 DIAGNOSIS — J34.2 NASAL SEPTAL DEVIATION: ICD-10-CM

## 2025-03-03 DIAGNOSIS — R22.0 PALATAL MASS: ICD-10-CM

## 2025-03-03 DIAGNOSIS — H93.12 TINNITUS OF LEFT EAR: ICD-10-CM

## 2025-03-03 DIAGNOSIS — H91.90 HEARING LOSS, UNSPECIFIED HEARING LOSS TYPE, UNSPECIFIED LATERALITY: Primary | ICD-10-CM

## 2025-03-03 DIAGNOSIS — H90.42 SENSORINEURAL HEARING LOSS (SNHL) OF LEFT EAR WITH UNRESTRICTED HEARING OF RIGHT EAR: ICD-10-CM

## 2025-03-03 DIAGNOSIS — H90.42 SENSORINEURAL HEARING LOSS (SNHL) OF LEFT EAR WITH UNRESTRICTED HEARING OF RIGHT EAR: Primary | ICD-10-CM

## 2025-03-03 PROCEDURE — 92557 COMPREHENSIVE HEARING TEST: CPT | Performed by: AUDIOLOGIST

## 2025-03-03 PROCEDURE — 92567 TYMPANOMETRY: CPT | Performed by: AUDIOLOGIST

## 2025-03-03 RX ORDER — PREDNISONE 20 MG/1
TABLET ORAL
Qty: 27 TABLET | Refills: 0 | Status: SHIPPED | OUTPATIENT
Start: 2025-03-03

## 2025-03-04 NOTE — PATIENT INSTRUCTIONS
Your audiogram shows an asymmetric hearing loss left greater than right in the higher frequencies.  The word discrimination score remains perfect at 100% bilaterally.  An MRI was recommended as there is no prior hearing test for comparison.  You are also on high-dose prednisone.  You have an incidental left septal deviation and a mucosal covered nodular area on the base of your uvula on the left.  This does not look to be worrisome.

## 2025-03-04 NOTE — PROGRESS NOTES
Aditya Copeland is a 39 year old male.   Chief Complaint   Patient presents with    Hearing Loss     Patient is here for hearing loss on the left ear reports ringing on the left      HPI:   Patient here with left-sided tinnitus and decreased hearing in the left ear.  Has not had any prior workup.    Current Outpatient Medications   Medication Sig Dispense Refill    predniSONE 20 MG Oral Tab Take 3 tabs by oral route for 7 days, then taper 20 mg every other day until off. 27 tablet 0    methylphenidate 10 MG Oral Tab Take 1 tablet (10 mg total) by mouth 2 (two) times daily. 60 tablet 0    losartan 50 MG Oral Tab Take 1 tablet (50 mg total) by mouth at bedtime. 90 tablet 3      Past Medical History:    Hypertension    Sleep apnea      Social History:  Social History     Socioeconomic History    Marital status:    Tobacco Use    Smoking status: Never     Passive exposure: Never    Smokeless tobacco: Never   Vaping Use    Vaping status: Never Used   Substance and Sexual Activity    Alcohol use: Yes     Alcohol/week: 0.0 standard drinks of alcohol     Comment: social    Drug use: Never    Sexual activity: Yes     Partners: Female        REVIEW OF SYSTEMS:   GENERAL HEALTH: feels well otherwise  GENERAL : denies fever, chills, sweats, weight loss, weight gain  SKIN: denies any unusual skin lesions or rashes  RESPIRATORY: denies shortness of breath with exertion  NEURO: denies headaches    EXAM:   There were no vitals taken for this visit.  System Details   Skin Inspection - Normal.   Constitutional Overall appearance - Normal.   Head/Face Facial features - Normal. Eyebrows - Normal. Skull - Normal.   Eyes Conjunctiva - Right: Normal, Left: Normal. Pupil - Right: Normal, Left: Normal.    Ears Inspection - Right: Normal, Left: Normal.   Canal - Right: Normal, Left: Normal.   TM - Right: Normal, Left: Normal.   Nasal External nose - Normal.   Nasal septum -septal deviation  Turbinates - Normal.   Oral/Oropharynx Lips -  Normal, Tonsils - Normal, Tongue - Normal.  Base of uvula on the left with mucosal covered nodular area.  Looks benign.   Neck Exam Inspection - Normal. Palpation - Normal. Parotid gland - Normal. Thyroid gland - Normal.  No carotid bruits by auscultation   Lymph Detail Submental. Submandibular. Anterior cervical. Posterior cervical. Supraclavicular all without enlargement   Psychiatric Orientation - Oriented to time, place, person & situation. Appropriate mood and affect.   Neurological Memory - Normal. Cranial nerves - Cranial nerves II through XII grossly intact.     ASSESSMENT AND PLAN:   1. Hearing loss, unspecified hearing loss type, unspecified laterality  Audiogram shows an asymmetric left-sided hearing loss in the high frequencies.  No audiogram for comparison.  Patient placed on prednisone and an MRI of the internal auditory canals with and without contrast ordered to rule out retrocochlear disease.  - Audiology Referral - [place] ([facility])    2. Sensorineural hearing loss (SNHL) of left ear with unrestricted hearing of right ear  Abhi was reviewed with the patient at the time of the visit and he was given a copy.  - MRI IACS (W+WO) (CPT=70553); Future    3. Nasal septal deviation  To the left.    4. Palatal mass  Benign-appearing uvular mass as above.      The patient indicates understanding of these issues and agrees to the plan.      Amberly Sagastume MD  3/3/2025  8:38 PM

## 2025-03-27 ENCOUNTER — OFFICE VISIT (OUTPATIENT)
Dept: AUDIOLOGY | Facility: CLINIC | Age: 40
End: 2025-03-27

## 2025-03-27 ENCOUNTER — OFFICE VISIT (OUTPATIENT)
Dept: OTOLARYNGOLOGY | Facility: CLINIC | Age: 40
End: 2025-03-27
Payer: COMMERCIAL

## 2025-03-27 DIAGNOSIS — H91.8X3 ASYMMETRICAL HEARING LOSS: Primary | ICD-10-CM

## 2025-03-27 DIAGNOSIS — H90.42 SENSORINEURAL HEARING LOSS (SNHL) OF LEFT EAR WITH UNRESTRICTED HEARING OF RIGHT EAR: Primary | ICD-10-CM

## 2025-03-27 PROCEDURE — 92553 AUDIOMETRY AIR & BONE: CPT | Performed by: AUDIOLOGIST

## 2025-03-27 PROCEDURE — 99213 OFFICE O/P EST LOW 20 MIN: CPT | Performed by: SPECIALIST

## 2025-03-27 NOTE — PATIENT INSTRUCTIONS
I called [facility] for update, I was advised still pending  You have an asymmetric hearing loss in the high frequencies on the left.  This did not improve after your oral steroids.  Please finish your MRI on Monday.  You can make an appointment with Dr. Rebollar for possible left middle ear injection of a steroid.

## 2025-03-27 NOTE — PROGRESS NOTES
Aditya Copeland is a 39 year old male.   Chief Complaint   Patient presents with    Follow - Up     Patient is here for 3 weeks follow up hearing loss       HPI:   Patient without improvement of his hearing after high-dose oral steroids on the left ear.    Current Outpatient Medications   Medication Sig Dispense Refill    methylphenidate 20 MG Oral Tab Take 1 tablet (20 mg total) by mouth daily. 30 tablet 0    methylphenidate 10 MG Oral Tab Take 1 tablet (10 mg total) by mouth Noon. 30 tablet 0    predniSONE 20 MG Oral Tab Take 3 tabs by oral route for 7 days, then taper 20 mg every other day until off. 27 tablet 0    losartan 50 MG Oral Tab Take 1 tablet (50 mg total) by mouth at bedtime. 90 tablet 3      Past Medical History:    Hypertension    Sleep apnea      Social History:  Social History     Socioeconomic History    Marital status:    Tobacco Use    Smoking status: Never     Passive exposure: Never    Smokeless tobacco: Never   Vaping Use    Vaping status: Never Used   Substance and Sexual Activity    Alcohol use: Yes     Alcohol/week: 0.0 standard drinks of alcohol     Comment: social    Drug use: Never    Sexual activity: Yes     Partners: Female        REVIEW OF SYSTEMS:   GENERAL HEALTH: feels well otherwise  GENERAL : denies fever, chills, sweats, weight loss, weight gain  SKIN: denies any unusual skin lesions or rashes  RESPIRATORY: denies shortness of breath with exertion  NEURO: denies headaches    EXAM:   There were no vitals taken for this visit.  System Details   Skin Inspection - Normal.   Constitutional Overall appearance - Normal.   Head/Face Facial features - Normal. Eyebrows - Normal. Skull - Normal.   Eyes Conjunctiva - Right: Normal, Left: Normal. Pupil - Right: Normal, Left: Normal.    Ears Inspection - Right: Normal, Left: Normal.   Canal - Right: Normal, Left: Normal.   TM - Right: Normal, Left: Normal.  No middle ear fluid either ear   Nasal External nose - Normal.    Oral/Oropharynx  Lips - Normal   Neck Exam Inspection - Normal. Palpation - Normal. Parotid gland - Normal. Thyroid gland - Normal.  No carotid bruits by auscultation   Lymph Detail Submental. Submandibular. Anterior cervical. Posterior cervical. Supraclavicular all without enlargement   Psychiatric Orientation - Oriented to time, place, person & situation. Appropriate mood and affect.   Neurological Memory - Normal. Cranial nerves - Cranial nerves II through XII grossly intact.     ASSESSMENT AND PLAN:   1. Asymmetrical hearing loss  Repeat ear only audiogram shows persistent high-frequency left sensorineural hearing loss.  Patient to get his MRI on Monday.  Follow-up with Dr. Rebollar for possible steroid injection into the middle ear on the left.  - Audiology Referral - [place] ([facility])      The patient indicates understanding of these issues and agrees to the plan.      Amberly Sagastume MD  3/27/2025  6:49 PM

## 2025-04-01 ENCOUNTER — HOSPITAL ENCOUNTER (OUTPATIENT)
Dept: MRI IMAGING | Age: 40
Discharge: HOME | End: 2025-04-01
Attending: SPECIALIST
Payer: COMMERCIAL

## 2025-04-01 DIAGNOSIS — H90.42 SENSORINEURAL HEARING LOSS (SNHL) OF LEFT EAR WITH UNRESTRICTED HEARING OF RIGHT EAR: ICD-10-CM

## 2025-04-01 PROCEDURE — 70553 MRI BRAIN STEM W/O & W/DYE: CPT | Performed by: SPECIALIST

## 2025-04-01 PROCEDURE — A9575 INJ GADOTERATE MEGLUMI 0.1ML: HCPCS | Performed by: SPECIALIST

## 2025-04-01 RX ORDER — GADOTERATE MEGLUMINE 376.9 MG/ML
20 INJECTION INTRAVENOUS
Status: COMPLETED | OUTPATIENT
Start: 2025-04-01 | End: 2025-04-01

## 2025-04-01 RX ADMIN — GADOTERATE MEGLUMINE 20 ML: 376.9 INJECTION INTRAVENOUS at 07:59:00

## 2025-04-04 NOTE — PROGRESS NOTES
MRI negative for retrocochlear disease.  Mild thickening of the ethmoid sinus.  Patient to follow up with Dr. Rebollar for possible steroid injection.

## 2025-04-11 ENCOUNTER — OFFICE VISIT (OUTPATIENT)
Dept: OTOLARYNGOLOGY | Facility: CLINIC | Age: 40
End: 2025-04-11
Payer: COMMERCIAL

## 2025-04-11 VITALS — WEIGHT: 273 LBS | HEIGHT: 70 IN | BODY MASS INDEX: 39.08 KG/M2

## 2025-04-11 DIAGNOSIS — H91.22 SUDDEN IDIOPATHIC HEARING LOSS OF LEFT EAR WITH UNRESTRICTED HEARING OF RIGHT EAR: ICD-10-CM

## 2025-04-11 DIAGNOSIS — H93.12 LEFT-SIDED TINNITUS: ICD-10-CM

## 2025-04-11 DIAGNOSIS — H91.8X3 ASYMMETRICAL HEARING LOSS: Primary | ICD-10-CM

## 2025-04-11 PROCEDURE — 99214 OFFICE O/P EST MOD 30 MIN: CPT | Performed by: OTOLARYNGOLOGY

## 2025-04-11 PROCEDURE — 3008F BODY MASS INDEX DOCD: CPT | Performed by: OTOLARYNGOLOGY

## 2025-04-11 NOTE — PROGRESS NOTES
NEW PATIENT PROGRESS NOTE  OTOLOGY/OTOLARYNGOLOGY    REF MD:  No referring provider defined for this encounter.     PCP: Sancho Orta MD    CHIEF COMPLAINT:    Chief Complaint   Patient presents with    Follow - Up     Patient here for hearing loss of left ear f/up, referred by Dr. Sagastume     History of Present Illness  Aditya Copeland is a 39 year old male who presents with left ear hearing loss and tinnitus.    He has been experiencing hearing loss in his left ear for approximately six months, initially noticed during an illness when he could not hear the beep of a thermometer in his left ear. The hearing loss is characterized as mild to moderate sensorineural hearing loss in the higher frequencies, while the right ear hears normally.    He also experiences tinnitus in the left ear, described as a ringing sound, which is particularly bothersome in quiet environments. The tinnitus has been persistent and has not improved with treatments tried so far. The tinnitus has impacted his ability to sleep, requiring him to use background noise to fall asleep.    An MRI was performed recently and returned normal results. He has tried oral steroids, which did not result in any improvement in his hearing.      PAST MEDICAL HISTORY:  Past Medical History[1]    PAST SURGICAL HISTORY:  Past Surgical History[2]    Medications Ordered Prior to Encounter[3]    Allergies: Allergies[4]    SOCIAL HISTORY:    Social History     Tobacco Use    Smoking status: Never     Passive exposure: Never    Smokeless tobacco: Never   Substance Use Topics    Alcohol use: Yes     Alcohol/week: 0.0 standard drinks of alcohol     Comment: social       Family History[5]    REVIEW OF SYSTEMS:   PER HPI    EXAMINATION:  I washed my hands with an alcohol-based hand gel prior to examination  Constitutional:   --Vitals: Height 5' 10\" (1.778 m), weight 273 lb (123.8 kg).  --General: no apparent distress, well-developed  Neuro: Facial movement normal  bilateral  Respiratory: No stridor, stertor or increased work of breathing  ENT:  --Ear: The bilateral ears were examined under binocular microscopy  Right ear microscopic exam:  Pinna: Normal, no lesions or masses.  Mastoid: Nontender on palpation.   External auditory canal: Clear, no masses or lesions.  Tympanic membrane: Intact, no lesions, normal landmarks.  Middle ear: Aerated.    Left ear microscopic exam:  Pinna: Normal, no lesions or masses.  Mastoid: Nontender on palpation.   External auditory canal: Clear, no masses or lesions.  Tympanic membrane: Intact, no lesions, normal landmarks.  Middle ear: Aerated.      Latest Audiogram Result (Hz) Exam performed: 3/27/2025 11:23 AM Last edited by Doreen Halney Au.D on 3/27/2025 11:26 AM        125 250  1500 2000 3000 4000 6000 8000    Left air:  20 15  15  15 50 60      Left air (masked):        50 60  55    Left mastoid bone:   15  10          Left mastoid bone (masked):       10 45 55         Reliability:  Fair    Transducer:  Inserts    Technique:  Conventional Audiometry    Comments:  Left ear only Dr. Sagastume          Latest Speech Audiometry  Last edited by Doreen Hanley Au.D on 3/27/2025 11:26 AM      Comments: DNT                 Latest Tympanogram Result       Probe Tone (Hz): Unknown Exam performed: 3/27/2025 11:23 AM Last edited by Doreen Hanley Au.D on 3/27/2025 11:26 AM        Right Ear Left Ear    Tympanogram type:      Canal volume (mL):      Peak pressure (daPa):      Peak amplitude (mL):      Tympanogram width (daPa):        Comments:  DNT                     Latest Audiogram and Tympanogram Result Text  Last edited by Doreen Hanley Au.D on 3/27/2025 11:35 AM      Addendum      Otoscopic Inspection:  both ears: no cerumen and TM visualized    Left ear pure tone only per Dr. Sagastume    Summary  Left: WNL-2000Hz falling to a moderate SNHL at 3000-8000Hz  Essentially unchanged since last test done 3/3/2025    Follow up with Amberly  ADOLFO Sagastume.  Audiological monitoring as needed during the course of medical management.           Addended by Doreen Hanley Au.D on 3/27/2025 11:35 AM             MRI IAC wwo contrast 04/01/2025  Impression   CONCLUSION:  1. Unremarkable MR appearance of the internal auditory canals.  Specifically, no evidence of suspicious retrocochlear and/or cerebellopontine angle mass.  2. Mild left ethmoid sinus mucosal thickening.       ASSESSMENT/PLAN:  Aditya Copeland is a 39 year old male with     ICD-10-CM   1. Asymmetrical hearing loss  H91.8X3   2. Sudden idiopathic hearing loss of left ear with unrestricted hearing of right ear  H91.22   3. Left-sided tinnitus  H93.12      Assessment & Plan  Sensorineural hearing loss in the left ear  Mild to moderate sensorineural hearing loss, more pronounced at higher frequencies. MRI normal. Etiology unclear. Oral steroids ineffective. Intratympanic steroid injections unlikely to improve hearing due to time elapsed since onset. Risks include non-healing perforations, infections, and low probability of hearing improvement. He opted against injections.  - Provide copy of most recent audiogram and medical clearance form.  - Advise to contact insurance for hearing aid benefits.  - Discuss hearing aid options with audiologist if he decides to pursue hearing aids.    Tinnitus  Tinnitus in the left ear likely secondary to sensorineural hearing loss. Bothersome at night, managed with background noise. Uncertain improvement with steroid injections. Limited data on tinnitus improvement. Coping strategies include sound machines, stress reduction, and dietary modifications. Magnesium may aid sleep and reduce tinnitus.  - Advise use of sound machines or apps for background noise.  - Recommend stress reduction techniques and avoidance of caffeine, high salt, and alcohol.  - Consider magnesium supplements to aid sleep and potentially reduce tinnitus.    Situation reviewed with the patient in  detail.    Wilian Lopez MD  Otology/Otolaryngology  [facility]   [address]  [place] [address]  Phone [phone]  Fax [phone]     I have personally performed the services described in this documentation. All medical record entries made by the scribe were at my direction and in my presence. I have reviewed the chart and agree that the medical record reflects my personal performance and is accurate and complete.             [1]   Past Medical History:   Hypertension    Sleep apnea   [2]   Past Surgical History:  Procedure Laterality Date    Knee arthroscopy  2014    X3 ACL bilateral.   [3]   Current Outpatient Medications on File Prior to Visit   Medication Sig Dispense Refill    methylphenidate 20 MG Oral Tab Take 1 tablet (20 mg total) by mouth daily. 30 tablet 0    methylphenidate 10 MG Oral Tab Take 1 tablet (10 mg total) by mouth Noon. 30 tablet 0    losartan 50 MG Oral Tab Take 1 tablet (50 mg total) by mouth at bedtime. 90 tablet 3    predniSONE 20 MG Oral Tab Take 3 tabs by oral route for 7 days, then taper 20 mg every other day until off. (Patient not taking: Reported on 4/11/2025) 27 tablet 0     No current facility-administered medications on file prior to visit.   [4] No Known Allergies  [5]   Family History  Problem Relation Age of Onset    No Known Problems Mother     Stroke Father 52    Hypertension Father     Other (tobacco use) Father     Breast Cancer Paternal Grandmother     Colon Cancer Paternal Grandfather 86    Blood Cancer Paternal Grandfather

## 2025-04-11 NOTE — PROGRESS NOTES
The following individual(s) verbally consented to be recorded using ambient AI listening technology and understand that they can each withdraw their consent to this listening technology at any point by asking the clinician to turn off or pause the recording:  Yes to consent  Patient name: Aditya Copeland  Additional names:

## 2025-04-22 ENCOUNTER — OFFICE VISIT (OUTPATIENT)
Facility: LOCATION | Age: 40
End: 2025-04-22
Payer: COMMERCIAL

## 2025-04-22 ENCOUNTER — TELEPHONE (OUTPATIENT)
Dept: INTERNAL MEDICINE CLINIC | Facility: CLINIC | Age: 40
End: 2025-04-22

## 2025-04-22 VITALS
HEIGHT: 70 IN | HEART RATE: 80 BPM | BODY MASS INDEX: 36.36 KG/M2 | DIASTOLIC BLOOD PRESSURE: 76 MMHG | SYSTOLIC BLOOD PRESSURE: 132 MMHG | WEIGHT: 254 LBS

## 2025-04-22 DIAGNOSIS — E66.812 CLASS 2 OBESITY: Primary | ICD-10-CM

## 2025-04-22 DIAGNOSIS — R73.03 PRE-DIABETES: ICD-10-CM

## 2025-04-22 DIAGNOSIS — I10 ESSENTIAL HYPERTENSION: ICD-10-CM

## 2025-04-22 DIAGNOSIS — E78.2 MIXED HYPERLIPIDEMIA: ICD-10-CM

## 2025-04-22 DIAGNOSIS — M17.0 BILATERAL PRIMARY OSTEOARTHRITIS OF KNEE: ICD-10-CM

## 2025-04-22 LAB
CARTRIDGE EXPIRATION DATE: ABNORMAL DATE
HEMOGLOBIN A1C: 5.7 % (ref 4.3–5.6)

## 2025-04-22 PROCEDURE — 3008F BODY MASS INDEX DOCD: CPT | Performed by: STUDENT IN AN ORGANIZED HEALTH CARE EDUCATION/TRAINING PROGRAM

## 2025-04-22 PROCEDURE — 99214 OFFICE O/P EST MOD 30 MIN: CPT | Performed by: STUDENT IN AN ORGANIZED HEALTH CARE EDUCATION/TRAINING PROGRAM

## 2025-04-22 PROCEDURE — 83036 HEMOGLOBIN GLYCOSYLATED A1C: CPT | Performed by: STUDENT IN AN ORGANIZED HEALTH CARE EDUCATION/TRAINING PROGRAM

## 2025-04-22 PROCEDURE — 3078F DIAST BP <80 MM HG: CPT | Performed by: STUDENT IN AN ORGANIZED HEALTH CARE EDUCATION/TRAINING PROGRAM

## 2025-04-22 PROCEDURE — 3075F SYST BP GE 130 - 139MM HG: CPT | Performed by: STUDENT IN AN ORGANIZED HEALTH CARE EDUCATION/TRAINING PROGRAM

## 2025-04-22 RX ORDER — TIRZEPATIDE 10 MG/.5ML
10 INJECTION, SOLUTION SUBCUTANEOUS WEEKLY
Qty: 2 ML | Refills: 0 | Status: SHIPPED | OUTPATIENT
Start: 2025-06-22 | End: 2025-07-20

## 2025-04-22 RX ORDER — TIRZEPATIDE 7.5 MG/.5ML
7.5 INJECTION, SOLUTION SUBCUTANEOUS WEEKLY
Qty: 2 ML | Refills: 0 | Status: SHIPPED | OUTPATIENT
Start: 2025-06-04 | End: 2025-07-02

## 2025-04-22 RX ORDER — TIRZEPATIDE 2.5 MG/.5ML
2.5 INJECTION, SOLUTION SUBCUTANEOUS WEEKLY
Qty: 2 ML | Refills: 0 | Status: SHIPPED | OUTPATIENT
Start: 2025-04-22 | End: 2025-05-20

## 2025-04-22 RX ORDER — TIRZEPATIDE 5 MG/.5ML
5 INJECTION, SOLUTION SUBCUTANEOUS WEEKLY
Qty: 2 ML | Refills: 0 | Status: SHIPPED | OUTPATIENT
Start: 2025-05-13 | End: 2025-06-10

## 2025-04-22 RX ORDER — LOSARTAN POTASSIUM 50 MG/1
50 TABLET ORAL NIGHTLY
Qty: 90 TABLET | Refills: 3 | Status: SHIPPED | OUTPATIENT
Start: 2025-04-22 | End: 2026-04-17

## 2025-04-22 NOTE — TELEPHONE ENCOUNTER
Approved    Prior authorization approved  Payer: [facility] Case ID: [id number]    [phone]    [phone]  Note from payer: Your PA request has been approved.  Additional information will be provided in the approval communication. (Message 1143)  Approval Details    Authorized from April 22, 2025 to December 18, 2025  Electronic appeal: Not supported  View History

## 2025-04-22 NOTE — PROGRESS NOTES
OFFICE NOTE       The following individual(s) verbally consented to be recorded using ambient AI listening technology and understand that they can each withdraw their consent to this listening technology at any point by asking the clinician to turn off or pause the recording:    Patient name: Aditya Copeland  Additional names:            Patient ID: Aditya Copeland is a 39 year old male.  Today's Date: 04/22/25  Chief Complaint: Diabetes      History of Present Illness  Aditya Copeland is a 39 year old male with hypertension and class two obesity who presents for a blood pressure follow-up.    His blood pressure has been generally well-controlled, with readings typically below 130/80 mmHg. He is currently taking losartan 50 mg daily. He also takes methylphenidate for ADD, which he does not take every day. He acknowledges that methylphenidate may affect his blood pressure.    He has a history of prediabetes with a current A1c of 5.7%, which has improved and is now borderline normal. He is actively working on lifestyle changes, including diet and exercise, to manage his weight and blood pressure. He engages in daily cardio and walking, and has recently started ice skating with his children once a week.    He has class two obesity with a BMI of 36.4. He is actively trying to lose weight through diet and exercise. His insurance covers certain weight loss medications, which he has discussed with his wife.    He has a history of hyperlipidemia and arthritis of the knees. No family history of medullary thyroid cancer.       Wt Readings from Last 6 Encounters:   04/22/25 254 lb (115.2 kg)   04/11/25 273 lb (123.8 kg)   02/19/25 273 lb (123.8 kg)   01/22/25 278 lb (126.1 kg)   10/18/24 282 lb (127.9 kg)   06/03/24 264 lb (119.7 kg)        This is a prior authorization request for Zepbound.  This medication is being used for obesity.  This medication will not be concomitantly used with other weight loss medications.  This  medication will not be used concomitantly with other GLP-1 agonist.    This is an INITIAL  therapy request.    At baseline the patient had BMI greater than 35 kg/m²  with co-morbidity of Hypertension, Hyperlipidemia and Osteoarthritis of the knee  Aditya Copeland  has tried and failed at least 6 months of behavioral modification and dietary restrictions.    This medication will concomitantly be used with continued behavior modification reduced calorie diet.       Vitals:    04/22/25 0848   BP: 132/76   Pulse: 80   Weight: 254 lb (115.2 kg)   Height: 5' 10\" (1.778 m)     body mass index is 36.45 kg/m².  BP Readings from Last 3 Encounters:   04/22/25 132/76   02/19/25 128/84   01/22/25 137/84     The 10-year ASCVD risk score (Maddi SANDERS, et al., 2019) is: 2.4%    Values used to calculate the score:      Age: 39 years      Sex: Male      Is Non- : No      Diabetic: No      Tobacco smoker: No      Systolic Blood Pressure: 132 mmHg      Is BP treated: Yes      HDL Cholesterol: 47 mg/dL      Total Cholesterol: 240 mg/dL  Results  LABS  A1c: 5.7 (04/22/2025)       Medications reviewed:  Current Medications[1]      Assessment & Plan    1. Class 2 obesity (Primary)  -     Zepbound; Inject 2.5 mg into the skin once a week for 28 days. IF FEELING WELL AFTER 4 DOSES INCREASE TO 5mg / week.  Dispense: 2 mL; Refill: 0  -     Zepbound; Inject 5 mg into the skin once a week for 28 days. IF FEELING WELL AFTER 4 DOSES INCREASE TO 7.5 MG WEEKLY  Dispense: 2 mL; Refill: 0  -     Zepbound; Inject 7.5 mg into the skin once a week for 28 days. IF FEELING WELL AFTER 4 DOSES INCREASE TO 10 MG WEEKLY  Dispense: 2 mL; Refill: 0  -     Zepbound; Inject 10 mg into the skin once a week for 28 days. IF FEELING WELL AFTER 4 DOSES INCREASE TO 12 MG WEEKLY  Dispense: 2 mL; Refill: 0  2. Pre-diabetes  -     POC Hemoglobin A1C  3. Essential hypertension  -     Zepbound; Inject 2.5 mg into the skin once a week for 28 days. IF FEELING  WELL AFTER 4 DOSES INCREASE TO 5mg / week.  Dispense: 2 mL; Refill: 0  -     Zepbound; Inject 5 mg into the skin once a week for 28 days. IF FEELING WELL AFTER 4 DOSES INCREASE TO 7.5 MG WEEKLY  Dispense: 2 mL; Refill: 0  -     Zepbound; Inject 7.5 mg into the skin once a week for 28 days. IF FEELING WELL AFTER 4 DOSES INCREASE TO 10 MG WEEKLY  Dispense: 2 mL; Refill: 0  -     Zepbound; Inject 10 mg into the skin once a week for 28 days. IF FEELING WELL AFTER 4 DOSES INCREASE TO 12 MG WEEKLY  Dispense: 2 mL; Refill: 0  4. Mixed hyperlipidemia  -     Zepbound; Inject 2.5 mg into the skin once a week for 28 days. IF FEELING WELL AFTER 4 DOSES INCREASE TO 5mg / week.  Dispense: 2 mL; Refill: 0  -     Zepbound; Inject 5 mg into the skin once a week for 28 days. IF FEELING WELL AFTER 4 DOSES INCREASE TO 7.5 MG WEEKLY  Dispense: 2 mL; Refill: 0  -     Zepbound; Inject 7.5 mg into the skin once a week for 28 days. IF FEELING WELL AFTER 4 DOSES INCREASE TO 10 MG WEEKLY  Dispense: 2 mL; Refill: 0  -     Zepbound; Inject 10 mg into the skin once a week for 28 days. IF FEELING WELL AFTER 4 DOSES INCREASE TO 12 MG WEEKLY  Dispense: 2 mL; Refill: 0  5. Bilateral primary osteoarthritis of knee      This is a prior authorization request for Zepbound.  This medication is being used for obesity.  This medication will not be concomitantly used with other weight loss medications.  This medication will not be used concomitantly with other GLP-1 agonist.    This is an INITIAL  therapy request.    At baseline the patient had BMI greater than 35 kg/m²  with co-morbidity of Hypertension, Hyperlipidemia and Osteoarthritis of the knee  Aditya Copeland  has tried and failed at least 6 months of behavioral modification and dietary restrictions.    This medication will concomitantly be used with continued behavior modification reduced calorie diet.   Assessment & Plan  Class 2 obesity  BMI 36.4, class 2 obesity. Insurance covers GLP-1 receptor  agonists. Zepbound preferred for weight loss. No family history of medullary thyroid cancer. Side effects: nausea, indigestion, bloating, fatigue, constipation.  - Submit prior authorization for Zepbound.  - Initiate Zepbound 2.5 mg weekly for 4 weeks, then increase to 5.0 mg weekly for 4 weeks, followed by 7.5 mg weekly for 4 weeks then 10mg weekly afterwards   - Educated on potential side effects: nausea, indigestion, bloating, fatigue, constipation.  - Advised increased water intake and fiber to mitigate constipation.  - Follow up in 3-4 months to assess weight loss and consider dose escalation.    Essential hypertension  Blood pressure controlled with losartan 50 mg as needed. Elevation noted with methylphenidate. Current readings below 130/80 mmHg. Weight reduction may improve control.  - Continue losartan 50 mg as needed.  - Monitor blood pressure regularly.  - Encourage continued weight loss through diet and exercise.    Hyperlipidemia  Comorbidity supporting GLP-1 receptor agonist therapy for weight management.    Prediabetes  A1c 5.7%, borderline normal prediabetes. Trend improving with lifestyle modifications.  - Continue lifestyle modifications to further reduce A1c.    Knee arthritis  Comorbidity supporting GLP-1 receptor agonist therapy for weight management.       Follow Up: As needed/if symptoms worsen or Return in about 3 months (around 7/28/2025) for obesity follow up..     Objective/ Results:   Physical Exam  HENT:      Head: Normocephalic and atraumatic.   Cardiovascular:      Rate and Rhythm: Normal rate and regular rhythm.   Pulmonary:      Effort: Pulmonary effort is normal.      Breath sounds: Normal breath sounds.   Abdominal:      General: Bowel sounds are normal.      Palpations: Abdomen is soft.   Musculoskeletal:         General: Normal range of motion.   Neurological:      General: No focal deficit present.      Mental Status: He is alert and oriented to person, place, and time.    Psychiatric:         Mood and Affect: Mood normal.        Physical Exam  MEASUREMENTS: BMI- 36.45.     Reviewed:    Patient Active Problem List    Diagnosis    Mixed hyperlipidemia    Acute cough    Malaise and fatigue    Erectile dysfunction    Essential hypertension    ACL tear      Allergies[2]   Short Social Hx on File[3]   Review of Systems   Constitutional: Negative.    Respiratory: Negative.     Cardiovascular: Negative.    Gastrointestinal: Negative.    Genitourinary: Negative.    Musculoskeletal: Negative.        All other systems negative unless otherwise stated in ROS or HPI above.       Sancho Orta MD  Internal Medicine       Call office with any questions or seek emergency care if necessary.   Patient understands and agrees to follow directions and advice.      ----------------------------------------- PATIENT INSTRUCTIONS-----------------------------------------     There are no Patient Instructions on file for this visit.        The 21st Century Cures Act makes medical notes available to patients in the interest of transparency.  However, please be advised that this is a medical document.  It is intended as a peer to peer communication.  It is written in medical language and may contain abbreviations or verbiage that are technical and unfamiliar.  It may appear blunt or direct.  Medical documents are intended to carry relevant information, facts as evident, and the clinical opinion of the practitioner.          [1]   Current Outpatient Medications   Medication Sig Dispense Refill    Tirzepatide-Weight Management (ZEPBOUND) 2.5 MG/0.5ML Subcutaneous Solution Auto-injector Inject 2.5 mg into the skin once a week for 28 days. IF FEELING WELL AFTER 4 DOSES INCREASE TO 5mg / week. 2 mL 0    [START ON 5/13/2025] Tirzepatide-Weight Management (ZEPBOUND) 5 MG/0.5ML Subcutaneous Solution Auto-injector Inject 5 mg into the skin once a week for 28 days. IF FEELING WELL AFTER 4 DOSES INCREASE TO 7.5 MG WEEKLY  2 mL 0    [START ON 6/4/2025] Tirzepatide-Weight Management (ZEPBOUND) 7.5 MG/0.5ML Subcutaneous Solution Auto-injector Inject 7.5 mg into the skin once a week for 28 days. IF FEELING WELL AFTER 4 DOSES INCREASE TO 10 MG WEEKLY 2 mL 0    [START ON 6/22/2025] Tirzepatide-Weight Management (ZEPBOUND) 10 MG/0.5ML Subcutaneous Solution Auto-injector Inject 10 mg into the skin once a week for 28 days. IF FEELING WELL AFTER 4 DOSES INCREASE TO 12 MG WEEKLY 2 mL 0    methylphenidate 20 MG Oral Tab Take 1 tablet (20 mg total) by mouth daily. 30 tablet 0    methylphenidate 10 MG Oral Tab Take 1 tablet (10 mg total) by mouth Noon. 30 tablet 0    losartan 50 MG Oral Tab Take 1 tablet (50 mg total) by mouth at bedtime. 90 tablet 3    predniSONE 20 MG Oral Tab Take 3 tabs by oral route for 7 days, then taper 20 mg every other day until off. (Patient not taking: Reported on 4/22/2025) 27 tablet 0   [2] No Known Allergies  [3]   Social History  Socioeconomic History    Marital status:    Tobacco Use    Smoking status: Never     Passive exposure: Never    Smokeless tobacco: Never   Vaping Use    Vaping status: Never Used   Substance and Sexual Activity    Alcohol use: Yes     Alcohol/week: 0.0 standard drinks of alcohol     Comment: social    Drug use: Never    Sexual activity: Yes     Partners: Female

## 2025-04-22 NOTE — TELEPHONE ENCOUNTER
A Prior Authorization(S) has been started.  To submit the PA, please follow the instructions below:   Login to [email or web address] and click \"Enter a Key\"  Enter the patient's last name, date of birth, and the Key provided below.  Complete the PA and click \"Send to Plan\" for approval.  Please notify the pharmacy when you receive a determination from the plan.     KEY:  [id number]  Medication:      [START ON 5/13/2025] Tirzepatide-Weight Management (ZEPBOUND) 5 MG/0.5ML Subcutaneous Solution Auto-injector, Inject 5 mg into the skin once a week for 28 days. IF FEELING WELL AFTER 4 DOSES INCREASE TO 7.5 MG WEEKLY, Disp: 2 mL, Rfl: 0      KEY:  [id number]  Medication:      [START ON 6/4/2025] Tirzepatide-Weight Management (ZEPBOUND) 7.5 MG/0.5ML Subcutaneous Solution Auto-injector, Inject 7.5 mg into the skin once a week for 28 days. IF FEELING WELL AFTER 4 DOSES INCREASE TO 10 MG WEEKLY, Disp: 2 mL, Rfl: 0    KEY:  [id number]  Medication:      [START ON 6/22/2025] Tirzepatide-Weight Management (ZEPBOUND) 10 MG/0.5ML Subcutaneous Solution Auto-injector, Inject 10 mg into the skin once a week for 28 days. IF FEELING WELL AFTER 4 DOSES INCREASE TO 12 MG WEEKLY, Disp: 2 mL, Rfl: 0

## 2025-04-25 ENCOUNTER — OFFICE VISIT (OUTPATIENT)
Dept: AUDIOLOGY | Facility: CLINIC | Age: 40
End: 2025-04-25
Payer: COMMERCIAL

## 2025-04-25 DIAGNOSIS — H93.12 TINNITUS OF LEFT EAR: ICD-10-CM

## 2025-04-25 DIAGNOSIS — H90.42 SENSORINEURAL HEARING LOSS (SNHL) OF LEFT EAR WITH UNRESTRICTED HEARING OF RIGHT EAR: Primary | ICD-10-CM

## 2025-04-28 NOTE — PROGRESS NOTES
HEARING AID EVALUATION    Aditya Copeland is a 39 year old male     Referring Provider: Amberly Sagastume   YOB: 1985  Medical Record: [id number]    Date of hearing assessment: 03/27/2025 (Hearing test valid for 365 days for the purpose of purchasing a hearing aid)    Patient History of Hearing Loss: Pt has history of sudden onset left hearing loss (7146-8986 Hz) & left-sided tinnitus.    Test Results:   See audiogram for air and bone conduction thresholds and recorded speech in quiet.    Quick SIN Speech in Noise Test presented binaurally through insert earphones yielded score of  0.5 SNR loss  The following table shows scoring for this test and gives an indication of how well this person would be expected to hear in a background of noise.    0 - 3 dB SNR-Loss              Normal  4 - 7 dB SNR-Loss              Mild  8 - 15 dB SNR Loss           Moderate  >15 dB SNR Loss               Severe    Hearing Handicap Inventory Screening Version (HHIE-S)  Total Score= 4  (0-8 no handicap, 10-24 mild-mod handicap, 26-40 severe handicap)    Hearing Aid Selection:    Based on the audiometric test results, patient perception of hearing difficulty, and patient lifestyle the following hearing aid(s) and features are recommended as medically necessary to improve the patient's ability to hear speech sounds in a variety of listening environments.   The use of hearing aids will enhance the patient's quality of life and improve the ability to maintain social contacts with work, family or friends.      Type/Style of hearing aid recommended:   MOI ( in canal with open domes    Recommended Features of hearing aid:    Rechargeable   Bluetooth Connectivity  Noise reduction to improve speech perception in noisy environments  Dual Microphones to improve hearing speech from different directions  Active Feedback management  Frequency Lowering Capabilities to improve speech perception with severe high-frequency hearing  loss  Echoblock/Windblock technology to reduce mitch noise and reverberation in certain environments  Tinnitus Masking capabilities  Automatic steering between listening environments.       Hearing aid charges and policies were discussed with patient including warranty information, trial period with amplification, benefits/limitations of current technology.  Realistic expectations and acclimating to new sounds was also discussed.        Pt verbalized understanding re: recommendations, stated he understood rational for MOI (tinnitus, HF HL); however, he would like to proceed with trial with premium CIC. He stated that he has $2500 lifetime max for hearing aids; will verify prior to dispensing. Scheduled HAF on 5/23/2025.     Hearing Aid Order:   : [id number]  Model: Edge AI 24 CIC   Color Choice: light brown   power left: L  Technology Level Choice: option 1    Audiologist:  Ewa Urbina  Audiology [place] License Number: [id number]

## 2025-05-02 ENCOUNTER — PATIENT MESSAGE (OUTPATIENT)
Facility: LOCATION | Age: 40
End: 2025-05-02

## 2025-05-02 DIAGNOSIS — K12.0 CANKER SORE: Primary | ICD-10-CM

## 2025-05-02 RX ORDER — VALACYCLOVIR HYDROCHLORIDE 1 G/1
1000 TABLET, FILM COATED ORAL EVERY 12 HOURS SCHEDULED
Qty: 21 TABLET | Refills: 0 | Status: SHIPPED | OUTPATIENT
Start: 2025-05-02 | End: 2025-05-13

## 2025-05-23 ENCOUNTER — OFFICE VISIT (OUTPATIENT)
Dept: AUDIOLOGY | Facility: CLINIC | Age: 40
End: 2025-05-23
Payer: COMMERCIAL

## 2025-05-23 DIAGNOSIS — H90.42 SENSORINEURAL HEARING LOSS (SNHL) OF LEFT EAR WITH UNRESTRICTED HEARING OF RIGHT EAR: Primary | ICD-10-CM

## 2025-05-23 NOTE — PROGRESS NOTES
Aditya Copeland                                                           Left:    Make/model/color:   Anatoliy Jeyson AI 24 CIC-312   Serial number:   [id number]   Repair/L&D:  05/23/2028   Filter:  [employer]   Battery:   312       EE Service exp:   11/23/2025   Dispensing date:   05/23/2025     Aditya Copeland, 40 year old, was seen today for HAid dispensing.  Good cosmetics.  Feedback manager completed.  Pt noted own voice loud.  Programming adjustments made for patient comfort; reduced gain for LF sounds.  Counseled pt re acclimation to amplification for new users; pt verbalized understanding.    Pt demonstrated ability to properly and fully insert & remove HAid, change battery, clean HAid.  Verbalized understanding of use/care precautions.  To review process of filter change at next visit.    Assisted pt in established BT connection to HAids, downloaded & demonstrated use of [employer] champ.  Turned off streaming (only using for remote control).     Purchase agreement completed.  Partial payment made following appt; benefit previously verified.  HAid check appt scheduled for 06/27/2025.  Pt to call with any questions/concerns that arise prior to that time.

## 2025-06-27 ENCOUNTER — OFFICE VISIT (OUTPATIENT)
Dept: AUDIOLOGY | Facility: CLINIC | Age: 40
End: 2025-06-27
Payer: COMMERCIAL

## 2025-06-27 DIAGNOSIS — H90.42 SENSORINEURAL HEARING LOSS (SNHL) OF LEFT EAR WITH UNRESTRICTED HEARING OF RIGHT EAR: Primary | ICD-10-CM

## 2025-06-27 PROCEDURE — 92592 HEARING AID CHECK, ONE EAR: CPT | Performed by: AUDIOLOGIST

## 2025-06-27 NOTE — PROGRESS NOTES
Aditya Copeland                                                           Left:    Make/model/color:   Anatoliy Benítez AI 24 CIC-312   Serial number:   [id number]   Repair/L&D:  05/23/2028   Filter:  anatoliy   Battery:   312       EE Service exp:   11/23/2025   Dispensing date:   05/23/2025     Aditya Copeland, 40 year old, was seen today for his 1st HAid check since dispensing.  Mr. Copeland stated that he was unsure of benefit, no difference in tinnitus.  He indicated full time use without any discomfort or concerns regarding sound quality.    Physical inspection and listening check revealed appropriate function and gain without any signs of difficulty in cleaning/caring for HAid.  Otoscopics revealed patent canals without any indication of irritation from HAid use.  Data logging revealed appropriate use patterns with minimal use of program button.  Increased overall gain 2 clicks.  Pt noted improved volume & sound quality. REM completed; HAid matching targets.     Trialed [employer] 1 miniRITE-R in office. Pt disliked sound quality and reported circuit noise overwhelming and worse than tinnitus.    Recheck appt scheduled for 08/01/2025; pt will cancel & schedule appt in November or December if not needed.  Pt to call with any issues/concerns that arise.

## 2025-07-23 ENCOUNTER — OFFICE VISIT (OUTPATIENT)
Dept: INTERNAL MEDICINE CLINIC | Facility: CLINIC | Age: 40
End: 2025-07-23
Payer: COMMERCIAL

## 2025-07-23 VITALS
HEIGHT: 70 IN | DIASTOLIC BLOOD PRESSURE: 66 MMHG | BODY MASS INDEX: 31.35 KG/M2 | SYSTOLIC BLOOD PRESSURE: 100 MMHG | HEART RATE: 69 BPM | WEIGHT: 219 LBS

## 2025-07-23 DIAGNOSIS — I10 ESSENTIAL HYPERTENSION: ICD-10-CM

## 2025-07-23 DIAGNOSIS — K12.0 CANKER SORES ORAL: ICD-10-CM

## 2025-07-23 DIAGNOSIS — M17.0 BILATERAL PRIMARY OSTEOARTHRITIS OF KNEE: ICD-10-CM

## 2025-07-23 DIAGNOSIS — E78.2 MIXED HYPERLIPIDEMIA: ICD-10-CM

## 2025-07-23 DIAGNOSIS — E66.812 CLASS 2 OBESITY: Primary | ICD-10-CM

## 2025-07-23 PROBLEM — Z87.828 HISTORY OF TEAR OF ACL (ANTERIOR CRUCIATE LIGAMENT): Status: ACTIVE | Noted: 2025-07-23

## 2025-07-23 PROBLEM — R05.1 ACUTE COUGH: Status: RESOLVED | Noted: 2023-11-13 | Resolved: 2025-07-23

## 2025-07-23 PROCEDURE — 99214 OFFICE O/P EST MOD 30 MIN: CPT | Performed by: STUDENT IN AN ORGANIZED HEALTH CARE EDUCATION/TRAINING PROGRAM

## 2025-07-23 PROCEDURE — 3008F BODY MASS INDEX DOCD: CPT | Performed by: STUDENT IN AN ORGANIZED HEALTH CARE EDUCATION/TRAINING PROGRAM

## 2025-07-23 PROCEDURE — 3078F DIAST BP <80 MM HG: CPT | Performed by: STUDENT IN AN ORGANIZED HEALTH CARE EDUCATION/TRAINING PROGRAM

## 2025-07-23 PROCEDURE — 3074F SYST BP LT 130 MM HG: CPT | Performed by: STUDENT IN AN ORGANIZED HEALTH CARE EDUCATION/TRAINING PROGRAM

## 2025-07-23 RX ORDER — VALACYCLOVIR HYDROCHLORIDE 1 G/1
1000 TABLET, FILM COATED ORAL EVERY 12 HOURS SCHEDULED
Qty: 14 TABLET | Refills: 3 | Status: SHIPPED | OUTPATIENT
Start: 2025-07-23 | End: 2025-07-30

## 2025-07-23 RX ORDER — TIRZEPATIDE 15 MG/.5ML
15 INJECTION, SOLUTION SUBCUTANEOUS WEEKLY
Qty: 2 ML | Refills: 11 | Status: SHIPPED | OUTPATIENT
Start: 2025-07-23 | End: 2025-08-20

## 2025-07-23 RX ORDER — TIRZEPATIDE 10 MG/.5ML
1 INJECTION, SOLUTION SUBCUTANEOUS
COMMUNITY
Start: 2025-06-26

## 2025-07-23 RX ORDER — TIRZEPATIDE 12.5 MG/.5ML
12.5 INJECTION, SOLUTION SUBCUTANEOUS WEEKLY
Qty: 2 ML | Refills: 1 | Status: SHIPPED | OUTPATIENT
Start: 2025-07-23 | End: 2025-08-20

## 2025-07-23 NOTE — PROGRESS NOTES
OFFICE NOTE       The following individual(s) verbally consented to be recorded using ambient AI listening technology and understand that they can each withdraw their consent to this listening technology at any point by asking the clinician to turn off or pause the recording:    Patient name: Aditya Copeland  Additional names:            Patient ID: Aditya Copeland is a 40 year old male.  Today's Date: 07/23/25  Chief Complaint: No chief complaint on file.      History of Present Illness  Aditya Copeland is a 40 year old male with obesity, hypertension, hyperlipidemia, and osteoarthritis who presents for follow-up on Zepbound.    He is currently on a 10 mg dose of Zepbound and has experienced no side effects such as nausea or indigestion. He reports that he has had no problems with increasing the dose of the medication. His weight has decreased from 282 pounds in October of the previous year to 219 pounds currently. He attributes this weight loss to dietary modifications and light exercise.    He manages recurrent HSV-1 outbreaks with Valtrex, taking 1 gram twice daily for a week during flare-ups. He does not take it daily but uses it as needed for outbreaks.     Wt Readings from Last 6 Encounters:   07/23/25 219 lb (99.3 kg)   04/22/25 254 lb (115.2 kg)   04/11/25 273 lb (123.8 kg)   02/19/25 273 lb (123.8 kg)   01/22/25 278 lb (126.1 kg)   10/18/24 282 lb (127.9 kg)        Vitals:    07/23/25 0849   BP: 100/66   Pulse: 69   Weight: 219 lb (99.3 kg)   Height: 5' 10\" (1.778 m)     body mass index is 31.42 kg/m².  BP Readings from Last 3 Encounters:   07/23/25 100/66   04/22/25 132/76   02/19/25 128/84     The 10-year ASCVD risk score (Maddi SANDERS, et al., 2019) is: 1.7%    Values used to calculate the score:      Age: 40 years      Sex: Male      Is Non- : No      Diabetic: No      Tobacco smoker: No      Systolic Blood Pressure: 100 mmHg      Is BP treated: Yes      HDL Cholesterol: 47  mg/dL      Total Cholesterol: 240 mg/dL  Results         Medications reviewed:  Current Medications[1]      Assessment & Plan    1. Class 2 obesity (Primary)  -     Zepbound; Inject 12.5 mg into the skin once a week for 28 days. IF FEELING WELL AFTER 4 DOSES INCREASE TO 15MG WEEKLY.  Dispense: 2 mL; Refill: 1  -     Zepbound; Inject 15 mg into the skin once a week for 28 days. Maximum dose.  Dispense: 2 mL; Refill: 11  2. Essential hypertension  -     Zepbound; Inject 12.5 mg into the skin once a week for 28 days. IF FEELING WELL AFTER 4 DOSES INCREASE TO 15MG WEEKLY.  Dispense: 2 mL; Refill: 1  -     Zepbound; Inject 15 mg into the skin once a week for 28 days. Maximum dose.  Dispense: 2 mL; Refill: 11  3. Mixed hyperlipidemia  -     Zepbound; Inject 12.5 mg into the skin once a week for 28 days. IF FEELING WELL AFTER 4 DOSES INCREASE TO 15MG WEEKLY.  Dispense: 2 mL; Refill: 1  -     Zepbound; Inject 15 mg into the skin once a week for 28 days. Maximum dose.  Dispense: 2 mL; Refill: 11  4. Bilateral primary osteoarthritis of knee  -     Zepbound; Inject 12.5 mg into the skin once a week for 28 days. IF FEELING WELL AFTER 4 DOSES INCREASE TO 15MG WEEKLY.  Dispense: 2 mL; Refill: 1  -     Zepbound; Inject 15 mg into the skin once a week for 28 days. Maximum dose.  Dispense: 2 mL; Refill: 11  5. Canker sores oral  -     valACYclovir HCl; Take 1 tablet (1,000 mg total) by mouth every 12 (twelve) hours for 7 days.  Dispense: 14 tablet; Refill: 3    Assessment & Plan  Obesity  Significant weight loss achieved with Zepbound 10 mg. Discussed increasing dose to 15 mg to enhance weight loss, with potential side effects. Prior authorization valid until December 2025.  - Prescribe Zepbound 12.5 mg for four weeks, then increase to 15 mg.  - Monitor for side effects such as nausea and fatigue.  - Continue caloric restriction and light workouts.  - Plan to step down the medication once weight loss goal is achieved.    Recurrent  Herpes Simplex Virus Type 1 (HSV-1)  Experiences oral canker sores, uses Valtrex during outbreaks. Advised on potential need for daily regimen if frequent flare-ups occur.  - Prescribe Valtrex 1 gram twice daily for 7 days during outbreaks, with 14 tablets and 3 refills.  - Monitor frequency of outbreaks. Consider daily Valtrex therapy if more than three flare-ups occur in a year.       Follow Up: As needed/if symptoms worsen or No follow-ups on file..     Objective/ Results:   Physical Exam  Constitutional:       Appearance: He is well-developed.   Cardiovascular:      Rate and Rhythm: Normal rate and regular rhythm.      Heart sounds: Normal heart sounds.   Pulmonary:      Effort: Pulmonary effort is normal.      Breath sounds: Normal breath sounds.   Abdominal:      General: Bowel sounds are normal.      Palpations: Abdomen is soft.   Skin:     General: Skin is warm and dry.   Neurological:      Mental Status: He is alert and oriented to person, place, and time.      Deep Tendon Reflexes: Reflexes are normal and symmetric.        Physical Exam  MEASUREMENTS: Weight- 219.     Reviewed:    Patient Active Problem List    Diagnosis    History of tear of ACL (anterior cruciate ligament)    Canker sores oral    Mixed hyperlipidemia    Erectile dysfunction    Essential hypertension      Allergies[2]   Short Social Hx on File[3]   Review of Systems   Constitutional: Negative.    Respiratory: Negative.     Cardiovascular: Negative.    Gastrointestinal: Negative.    Skin: Negative.    Neurological: Negative.        All other systems negative unless otherwise stated in ROS or HPI above.       Sancho Orta MD  Internal Medicine       Call office with any questions or seek emergency care if necessary.   Patient understands and agrees to follow directions and advice.      ----------------------------------------- PATIENT INSTRUCTIONS-----------------------------------------     There are no Patient Instructions on file for  this visit.        The 21st Century Cures Act makes medical notes available to patients in the interest of transparency.  However, please be advised that this is a medical document.  It is intended as a peer to peer communication.  It is written in medical language and may contain abbreviations or verbiage that are technical and unfamiliar.  It may appear blunt or direct.  Medical documents are intended to carry relevant information, facts as evident, and the clinical opinion of the practitioner.          [1]   Current Outpatient Medications   Medication Sig Dispense Refill    ZEPBOUND 10 MG/0.5ML Subcutaneous Solution Auto-injector Place 1 Ring vaginally every 28 days. FOR 21 DAYS IN, THEN REMOVE FOR 7 DAYS      Tirzepatide-Weight Management (ZEPBOUND) 12.5 MG/0.5ML Subcutaneous Solution Auto-injector Inject 12.5 mg into the skin once a week for 28 days. IF FEELING WELL AFTER 4 DOSES INCREASE TO 15MG WEEKLY. 2 mL 1    Tirzepatide-Weight Management (ZEPBOUND) 15 MG/0.5ML Subcutaneous Solution Auto-injector Inject 15 mg into the skin once a week for 28 days. Maximum dose. 2 mL 11    valACYclovir 1 G Oral Tab Take 1 tablet (1,000 mg total) by mouth every 12 (twelve) hours for 7 days. 14 tablet 3    [START ON 7/29/2025] methylphenidate 20 MG Oral Tab Take 1 tablet (20 mg total) by mouth 2 (two) times daily at 8am and at noon. 60 tablet 0    losartan 50 MG Oral Tab Take 1 tablet (50 mg total) by mouth at bedtime. 90 tablet 3    [START ON 8/28/2025] methylphenidate 20 MG Oral Tab Take 1 tablet (20 mg total) by mouth 2 (two) times daily at 8am and at noon. 60 tablet 0    [START ON 9/27/2025] methylphenidate 20 MG Oral Tab Take 1 tablet (20 mg total) by mouth 2 (two) times daily at 8am and at noon. 60 tablet 0   [2] No Known Allergies  [3]   Social History  Socioeconomic History    Marital status:    Tobacco Use    Smoking status: Never     Passive exposure: Never    Smokeless tobacco: Never   Vaping Use    Vaping  status: Never Used   Substance and Sexual Activity    Alcohol use: Yes     Alcohol/week: 0.0 standard drinks of alcohol     Comment: social    Drug use: Never    Sexual activity: Yes     Partners: Female

## 2025-08-01 ENCOUNTER — OFFICE VISIT (OUTPATIENT)
Dept: AUDIOLOGY | Facility: CLINIC | Age: 40
End: 2025-08-01

## 2025-08-01 DIAGNOSIS — H90.42 SENSORINEURAL HEARING LOSS (SNHL) OF LEFT EAR WITH UNRESTRICTED HEARING OF RIGHT EAR: Primary | ICD-10-CM

## 2025-08-01 DIAGNOSIS — H93.12 TINNITUS OF LEFT EAR: ICD-10-CM

## 2025-08-01 PROCEDURE — 92592 HEARING AID CHECK, ONE EAR: CPT | Performed by: AUDIOLOGIST

## (undated) NOTE — LETTER
[facility] [address]  Dept: [phone]  Dept Fax: [phone]: [phone]         February 22, 2023    Patient: Gold Alexis   YOB: 1985   Date of Visit: 2/22/2023       To Whom It May Concern:    Gold Alexis was seen and treated in our [facility] department on 2/22/2023. He may return to work on 2/23/23. If you have any questions or concerns, please don't hesitate to call.       Encounter Provider(s):    ADRIÁN Servin     10:39 AM  2/22/2023

## (undated) NOTE — LETTER
12/08/21        Aditya Perera 81      Dear Cece Isbell,    Our records indicate that you have outstanding lab work and or testing that was ordered for you and has not yet been completed:  Orders Placed This Encounter      Comp Met